# Patient Record
Sex: MALE | Race: BLACK OR AFRICAN AMERICAN | Employment: OTHER | URBAN - METROPOLITAN AREA
[De-identification: names, ages, dates, MRNs, and addresses within clinical notes are randomized per-mention and may not be internally consistent; named-entity substitution may affect disease eponyms.]

---

## 2017-02-14 ENCOUNTER — OFFICE VISIT (OUTPATIENT)
Dept: INTERNAL MEDICINE | Age: 67
End: 2017-02-14

## 2017-02-14 VITALS
HEART RATE: 69 BPM | BODY MASS INDEX: 23.1 KG/M2 | RESPIRATION RATE: 16 BRPM | OXYGEN SATURATION: 99 % | HEIGHT: 74 IN | SYSTOLIC BLOOD PRESSURE: 128 MMHG | TEMPERATURE: 97.5 F | WEIGHT: 180 LBS | DIASTOLIC BLOOD PRESSURE: 74 MMHG

## 2017-02-14 DIAGNOSIS — Z11.59 NEED FOR HEPATITIS C SCREENING TEST: ICD-10-CM

## 2017-02-14 DIAGNOSIS — N18.2 CKD (CHRONIC KIDNEY DISEASE) STAGE 2, GFR 60-89 ML/MIN: ICD-10-CM

## 2017-02-14 DIAGNOSIS — Z11.4 SCREENING FOR HIV (HUMAN IMMUNODEFICIENCY VIRUS): ICD-10-CM

## 2017-02-14 DIAGNOSIS — Z12.5 SCREENING FOR PROSTATE CANCER: ICD-10-CM

## 2017-02-14 DIAGNOSIS — I10 ESSENTIAL HYPERTENSION, BENIGN: Primary | ICD-10-CM

## 2017-02-14 DIAGNOSIS — D72.819 LEUKOPENIA, UNSPECIFIED TYPE: ICD-10-CM

## 2017-02-14 PROCEDURE — 99213 OFFICE O/P EST LOW 20 MIN: CPT | Performed by: INTERNAL MEDICINE

## 2017-02-14 RX ORDER — LISINOPRIL 20 MG/1
20 TABLET ORAL DAILY
Qty: 90 TABLET | Refills: 0 | Status: SHIPPED | OUTPATIENT
Start: 2017-02-14 | End: 2017-06-22 | Stop reason: SDUPTHER

## 2017-02-14 RX ORDER — TERAZOSIN 2 MG/1
2 CAPSULE ORAL DAILY
Qty: 90 CAPSULE | Refills: 0 | Status: SHIPPED | OUTPATIENT
Start: 2017-02-14 | End: 2017-06-22 | Stop reason: SDUPTHER

## 2017-02-14 RX ORDER — AMLODIPINE BESYLATE 10 MG/1
10 TABLET ORAL DAILY
Qty: 90 TABLET | Refills: 0 | Status: SHIPPED | OUTPATIENT
Start: 2017-02-14 | End: 2017-06-22 | Stop reason: SDUPTHER

## 2017-02-14 ASSESSMENT — ENCOUNTER SYMPTOMS
ABDOMINAL PAIN: 0
SHORTNESS OF BREATH: 0
COUGH: 0
WHEEZING: 0

## 2017-06-03 DIAGNOSIS — I10 ESSENTIAL HYPERTENSION, BENIGN: ICD-10-CM

## 2017-06-03 DIAGNOSIS — N18.2 CKD (CHRONIC KIDNEY DISEASE) STAGE 2, GFR 60-89 ML/MIN: ICD-10-CM

## 2017-06-03 DIAGNOSIS — Z12.5 SCREENING FOR PROSTATE CANCER: ICD-10-CM

## 2017-06-03 DIAGNOSIS — D72.819 LEUKOPENIA, UNSPECIFIED TYPE: ICD-10-CM

## 2017-06-03 LAB
ANION GAP SERPL CALCULATED.3IONS-SCNC: 9 MEQ/L (ref 7–13)
BUN BLDV-MCNC: 11 MG/DL (ref 8–23)
CALCIUM SERPL-MCNC: 8.5 MG/DL (ref 8.6–10.2)
CHLORIDE BLD-SCNC: 104 MEQ/L (ref 98–107)
CO2: 25 MEQ/L (ref 22–29)
CREAT SERPL-MCNC: 1.14 MG/DL (ref 0.7–1.2)
GFR AFRICAN AMERICAN: >60
GFR NON-AFRICAN AMERICAN: >60
GLUCOSE BLD-MCNC: 85 MG/DL (ref 74–109)
HCT VFR BLD CALC: 42.4 % (ref 42–52)
HEMOGLOBIN: 13.9 G/DL (ref 14–18)
MCH RBC QN AUTO: 27.8 PG (ref 27–31.3)
MCHC RBC AUTO-ENTMCNC: 32.8 % (ref 33–37)
MCV RBC AUTO: 84.7 FL (ref 80–100)
PDW BLD-RTO: 14.3 % (ref 11.5–14.5)
PLATELET # BLD: 250 K/UL (ref 130–400)
POTASSIUM SERPL-SCNC: 4.8 MEQ/L (ref 3.5–5.1)
PROSTATE SPECIFIC ANTIGEN: 1.36 NG/ML (ref 0–5.4)
RBC # BLD: 5 M/UL (ref 4.7–6.1)
SODIUM BLD-SCNC: 138 MEQ/L (ref 132–144)
WBC # BLD: 2.8 K/UL (ref 4.8–10.8)

## 2017-06-22 ENCOUNTER — OFFICE VISIT (OUTPATIENT)
Dept: INTERNAL MEDICINE | Age: 67
End: 2017-06-22

## 2017-06-22 VITALS
OXYGEN SATURATION: 98 % | WEIGHT: 179 LBS | SYSTOLIC BLOOD PRESSURE: 122 MMHG | HEART RATE: 83 BPM | TEMPERATURE: 98 F | RESPIRATION RATE: 16 BRPM | BODY MASS INDEX: 22.97 KG/M2 | HEIGHT: 74 IN | DIASTOLIC BLOOD PRESSURE: 70 MMHG

## 2017-06-22 DIAGNOSIS — I10 ESSENTIAL HYPERTENSION, BENIGN: Primary | ICD-10-CM

## 2017-06-22 DIAGNOSIS — D72.819 LEUKOPENIA, UNSPECIFIED TYPE: ICD-10-CM

## 2017-06-22 DIAGNOSIS — N18.2 CKD (CHRONIC KIDNEY DISEASE) STAGE 2, GFR 60-89 ML/MIN: ICD-10-CM

## 2017-06-22 PROCEDURE — 99214 OFFICE O/P EST MOD 30 MIN: CPT | Performed by: INTERNAL MEDICINE

## 2017-06-22 RX ORDER — TERAZOSIN 2 MG/1
2 CAPSULE ORAL DAILY
Qty: 90 CAPSULE | Refills: 0 | Status: SHIPPED | OUTPATIENT
Start: 2017-06-22 | End: 2017-09-21 | Stop reason: SDUPTHER

## 2017-06-22 RX ORDER — AMLODIPINE BESYLATE 10 MG/1
10 TABLET ORAL DAILY
Qty: 90 TABLET | Refills: 0 | Status: SHIPPED | OUTPATIENT
Start: 2017-06-22 | End: 2017-09-21 | Stop reason: SDUPTHER

## 2017-06-22 RX ORDER — LISINOPRIL 20 MG/1
20 TABLET ORAL DAILY
Qty: 90 TABLET | Refills: 0 | Status: SHIPPED | OUTPATIENT
Start: 2017-06-22 | End: 2017-09-21 | Stop reason: SDUPTHER

## 2017-06-22 ASSESSMENT — PATIENT HEALTH QUESTIONNAIRE - PHQ9
SUM OF ALL RESPONSES TO PHQ9 QUESTIONS 1 & 2: 0
SUM OF ALL RESPONSES TO PHQ QUESTIONS 1-9: 0
2. FEELING DOWN, DEPRESSED OR HOPELESS: 0
1. LITTLE INTEREST OR PLEASURE IN DOING THINGS: 0

## 2017-06-22 ASSESSMENT — ENCOUNTER SYMPTOMS
COUGH: 0
BLOOD IN STOOL: 0
SHORTNESS OF BREATH: 0
NAUSEA: 0
CONSTIPATION: 0
DIARRHEA: 0
VOMITING: 0
ABDOMINAL PAIN: 0

## 2017-07-05 DIAGNOSIS — Z12.11 SCREEN FOR COLON CANCER: Primary | ICD-10-CM

## 2017-07-10 LAB — HEPATITIS C ANTIBODY INTERPRETATION: NORMAL

## 2017-07-12 ENCOUNTER — ANESTHESIA EVENT (OUTPATIENT)
Dept: ENDOSCOPY | Age: 67
End: 2017-07-12
Payer: COMMERCIAL

## 2017-07-12 LAB — HIV-1 AND HIV-2 ANTIBODIES: NEGATIVE

## 2017-07-13 ENCOUNTER — HOSPITAL ENCOUNTER (OUTPATIENT)
Age: 67
Setting detail: OUTPATIENT SURGERY
Discharge: HOME | End: 2017-07-13
Attending: INTERNAL MEDICINE | Admitting: INTERNAL MEDICINE
Payer: COMMERCIAL

## 2017-07-13 ENCOUNTER — ANESTHESIA (OUTPATIENT)
Dept: ENDOSCOPY | Age: 67
End: 2017-07-13
Payer: COMMERCIAL

## 2017-07-13 ENCOUNTER — OFFICE VISIT (OUTPATIENT)
Dept: GASTROENTEROLOGY | Age: 67
End: 2017-07-13

## 2017-07-13 VITALS
SYSTOLIC BLOOD PRESSURE: 121 MMHG | TEMPERATURE: 97.6 F | BODY MASS INDEX: 23.1 KG/M2 | HEART RATE: 80 BPM | OXYGEN SATURATION: 100 % | DIASTOLIC BLOOD PRESSURE: 74 MMHG | WEIGHT: 180 LBS | HEIGHT: 74 IN | RESPIRATION RATE: 20 BRPM

## 2017-07-13 VITALS
OXYGEN SATURATION: 97 % | SYSTOLIC BLOOD PRESSURE: 113 MMHG | RESPIRATION RATE: 20 BRPM | DIASTOLIC BLOOD PRESSURE: 68 MMHG

## 2017-07-13 DIAGNOSIS — Z79.899 ENCOUNTER FOR LONG-TERM (CURRENT) USE OF MEDICATIONS: ICD-10-CM

## 2017-07-13 DIAGNOSIS — Z12.11 SPECIAL SCREENING FOR MALIGNANT NEOPLASMS, COLON: Primary | ICD-10-CM

## 2017-07-13 PROCEDURE — 3700000001 HC ADD 15 MINUTES (ANESTHESIA): Performed by: INTERNAL MEDICINE

## 2017-07-13 PROCEDURE — PREOPEXAM PRE-OP EXAM: Performed by: INTERNAL MEDICINE

## 2017-07-13 PROCEDURE — 7100000010 HC PHASE II RECOVERY - FIRST 15 MIN: Performed by: INTERNAL MEDICINE

## 2017-07-13 PROCEDURE — 6360000002 HC RX W HCPCS: Performed by: NURSE ANESTHETIST, CERTIFIED REGISTERED

## 2017-07-13 PROCEDURE — 3700000000 HC ANESTHESIA ATTENDED CARE: Performed by: INTERNAL MEDICINE

## 2017-07-13 PROCEDURE — 2500000003 HC RX 250 WO HCPCS: Performed by: NURSE ANESTHETIST, CERTIFIED REGISTERED

## 2017-07-13 PROCEDURE — 2580000003 HC RX 258: Performed by: INTERNAL MEDICINE

## 2017-07-13 PROCEDURE — 3609027000 HC COLONOSCOPY: Performed by: INTERNAL MEDICINE

## 2017-07-13 RX ORDER — SODIUM CHLORIDE 0.9 % (FLUSH) 0.9 %
10 SYRINGE (ML) INJECTION PRN
Status: CANCELLED | OUTPATIENT
Start: 2017-07-13

## 2017-07-13 RX ORDER — SODIUM CHLORIDE 0.9 % (FLUSH) 0.9 %
10 SYRINGE (ML) INJECTION EVERY 12 HOURS SCHEDULED
Status: CANCELLED | OUTPATIENT
Start: 2017-07-13

## 2017-07-13 RX ORDER — SODIUM CHLORIDE 9 MG/ML
INJECTION, SOLUTION INTRAVENOUS CONTINUOUS
Status: DISCONTINUED | OUTPATIENT
Start: 2017-07-13 | End: 2017-07-13 | Stop reason: HOSPADM

## 2017-07-13 RX ORDER — LIDOCAINE HYDROCHLORIDE 10 MG/ML
1 INJECTION, SOLUTION EPIDURAL; INFILTRATION; INTRACAUDAL; PERINEURAL
Status: CANCELLED | OUTPATIENT
Start: 2017-07-13 | End: 2017-07-13

## 2017-07-13 RX ORDER — LIDOCAINE HYDROCHLORIDE 20 MG/ML
INJECTION, SOLUTION INFILTRATION; PERINEURAL PRN
Status: DISCONTINUED | OUTPATIENT
Start: 2017-07-13 | End: 2017-07-13 | Stop reason: SDUPTHER

## 2017-07-13 RX ORDER — ONDANSETRON 2 MG/ML
4 INJECTION INTRAMUSCULAR; INTRAVENOUS
Status: DISCONTINUED | OUTPATIENT
Start: 2017-07-13 | End: 2017-07-13 | Stop reason: HOSPADM

## 2017-07-13 RX ORDER — PROPOFOL 10 MG/ML
INJECTION, EMULSION INTRAVENOUS PRN
Status: DISCONTINUED | OUTPATIENT
Start: 2017-07-13 | End: 2017-07-13 | Stop reason: SDUPTHER

## 2017-07-13 RX ADMIN — SODIUM CHLORIDE: 9 INJECTION, SOLUTION INTRAVENOUS at 13:20

## 2017-07-13 RX ADMIN — LIDOCAINE HYDROCHLORIDE 60 MG: 20 INJECTION, SOLUTION INFILTRATION; PERINEURAL at 14:31

## 2017-07-13 RX ADMIN — PROPOFOL 250 MG: 10 INJECTION, EMULSION INTRAVENOUS at 14:31

## 2017-07-13 ASSESSMENT — PAIN - FUNCTIONAL ASSESSMENT: PAIN_FUNCTIONAL_ASSESSMENT: 0-10

## 2017-08-01 VITALS
BODY MASS INDEX: 22.98 KG/M2 | DIASTOLIC BLOOD PRESSURE: 74 MMHG | WEIGHT: 179 LBS | HEART RATE: 81 BPM | SYSTOLIC BLOOD PRESSURE: 118 MMHG

## 2017-09-21 ENCOUNTER — OFFICE VISIT (OUTPATIENT)
Dept: FAMILY MEDICINE CLINIC | Age: 67
End: 2017-09-21

## 2017-09-21 VITALS
BODY MASS INDEX: 22.8 KG/M2 | WEIGHT: 172 LBS | TEMPERATURE: 96.3 F | OXYGEN SATURATION: 98 % | HEIGHT: 73 IN | RESPIRATION RATE: 15 BRPM | SYSTOLIC BLOOD PRESSURE: 136 MMHG | DIASTOLIC BLOOD PRESSURE: 82 MMHG | HEART RATE: 74 BPM

## 2017-09-21 DIAGNOSIS — R94.31 ABNORMAL EKG: ICD-10-CM

## 2017-09-21 DIAGNOSIS — I10 ESSENTIAL HYPERTENSION, BENIGN: Primary | ICD-10-CM

## 2017-09-21 PROCEDURE — 99213 OFFICE O/P EST LOW 20 MIN: CPT | Performed by: FAMILY MEDICINE

## 2017-09-21 PROCEDURE — 93000 ELECTROCARDIOGRAM COMPLETE: CPT | Performed by: FAMILY MEDICINE

## 2017-09-21 RX ORDER — TERAZOSIN 2 MG/1
2 CAPSULE ORAL DAILY
Qty: 90 CAPSULE | Refills: 1 | Status: SHIPPED | OUTPATIENT
Start: 2017-09-21 | End: 2018-02-25 | Stop reason: SDUPTHER

## 2017-09-21 RX ORDER — LISINOPRIL 20 MG/1
20 TABLET ORAL DAILY
Qty: 90 TABLET | Refills: 1 | Status: SHIPPED | OUTPATIENT
Start: 2017-09-21 | End: 2018-02-25 | Stop reason: SDUPTHER

## 2017-09-21 RX ORDER — AMLODIPINE BESYLATE 10 MG/1
10 TABLET ORAL DAILY
Qty: 90 TABLET | Refills: 1 | Status: SHIPPED | OUTPATIENT
Start: 2017-09-21 | End: 2018-02-25 | Stop reason: SDUPTHER

## 2017-09-21 ASSESSMENT — ENCOUNTER SYMPTOMS
SHORTNESS OF BREATH: 0
ABDOMINAL PAIN: 0

## 2017-09-29 ENCOUNTER — HOSPITAL ENCOUNTER (OUTPATIENT)
Dept: NON INVASIVE DIAGNOSTICS | Age: 67
Discharge: HOME | End: 2017-09-29
Payer: MEDICARE

## 2017-09-29 DIAGNOSIS — R94.31 ABNORMAL EKG: ICD-10-CM

## 2017-09-29 LAB
LV EF: 60 %
LVEF MODALITY: NORMAL

## 2017-09-29 PROCEDURE — 93306 TTE W/DOPPLER COMPLETE: CPT

## 2017-10-20 DIAGNOSIS — I10 ESSENTIAL HYPERTENSION, BENIGN: ICD-10-CM

## 2017-10-20 LAB
ALBUMIN SERPL-MCNC: 4 G/DL (ref 3.9–4.9)
ALP BLD-CCNC: 59 U/L (ref 35–104)
ALT SERPL-CCNC: 12 U/L (ref 0–41)
ANION GAP SERPL CALCULATED.3IONS-SCNC: 12 MEQ/L (ref 7–13)
ANISOCYTOSIS: 0
AST SERPL-CCNC: 15 U/L (ref 0–40)
ATYPICAL LYMPHOCYTE RELATIVE PERCENT: 10 %
BANDED NEUTROPHILS RELATIVE PERCENT: 1 %
BASOPHILS ABSOLUTE: 0 K/UL (ref 0–0.2)
BASOPHILS RELATIVE PERCENT: 1 %
BILIRUB SERPL-MCNC: 0.7 MG/DL (ref 0–1.2)
BUN BLDV-MCNC: 12 MG/DL (ref 8–23)
CALCIUM SERPL-MCNC: 9.1 MG/DL (ref 8.6–10.2)
CHLORIDE BLD-SCNC: 104 MEQ/L (ref 98–107)
CHOLESTEROL, TOTAL: 156 MG/DL (ref 0–199)
CO2: 25 MEQ/L (ref 22–29)
CREAT SERPL-MCNC: 0.85 MG/DL (ref 0.7–1.2)
EOSINOPHILS ABSOLUTE: 0.2 K/UL (ref 0–0.7)
EOSINOPHILS RELATIVE PERCENT: 5 %
GFR AFRICAN AMERICAN: >60
GFR NON-AFRICAN AMERICAN: >60
GLOBULIN: 2.8 G/DL (ref 2.3–3.5)
GLUCOSE BLD-MCNC: 84 MG/DL (ref 74–109)
HCT VFR BLD CALC: 44.9 % (ref 42–52)
HDLC SERPL-MCNC: 47 MG/DL (ref 40–59)
HEMOGLOBIN: 14.6 G/DL (ref 14–18)
HYPOCHROMIA: 0
LDL CHOLESTEROL CALCULATED: 95 MG/DL (ref 0–129)
LYMPHOCYTES ABSOLUTE: 1 K/UL (ref 1–4.8)
LYMPHOCYTES RELATIVE PERCENT: 17 %
MACROCYTES: 0
MCH RBC QN AUTO: 27.4 PG (ref 27–31.3)
MCHC RBC AUTO-ENTMCNC: 32.4 % (ref 33–37)
MCV RBC AUTO: 84.6 FL (ref 80–100)
MICROCYTES: ABNORMAL
MONOCYTES ABSOLUTE: 0.4 K/UL (ref 0.2–0.8)
MONOCYTES RELATIVE PERCENT: 9.6 %
NEUTROPHILS ABSOLUTE: 2.1 K/UL (ref 1.4–6.5)
NEUTROPHILS RELATIVE PERCENT: 56 %
PDW BLD-RTO: 13.9 % (ref 11.5–14.5)
PLATELET # BLD: 249 K/UL (ref 130–400)
PLATELET SLIDE REVIEW: ADEQUATE
POIKILOCYTES: ABNORMAL
POLYCHROMASIA: 0
POTASSIUM SERPL-SCNC: 4.2 MEQ/L (ref 3.5–5.1)
RBC # BLD: 5.31 M/UL (ref 4.7–6.1)
SMUDGE CELLS: 4.3
SODIUM BLD-SCNC: 141 MEQ/L (ref 132–144)
TOTAL PROTEIN: 6.8 G/DL (ref 6.4–8.1)
TRIGL SERPL-MCNC: 69 MG/DL (ref 0–200)
WBC # BLD: 3.7 K/UL (ref 4.8–10.8)

## 2017-10-26 ENCOUNTER — OFFICE VISIT (OUTPATIENT)
Dept: FAMILY MEDICINE CLINIC | Age: 67
End: 2017-10-26

## 2017-10-26 VITALS
WEIGHT: 179 LBS | TEMPERATURE: 96.7 F | RESPIRATION RATE: 14 BRPM | SYSTOLIC BLOOD PRESSURE: 134 MMHG | BODY MASS INDEX: 23.72 KG/M2 | OXYGEN SATURATION: 98 % | HEIGHT: 73 IN | DIASTOLIC BLOOD PRESSURE: 78 MMHG | HEART RATE: 79 BPM

## 2017-10-26 DIAGNOSIS — I10 ESSENTIAL HYPERTENSION, BENIGN: Primary | ICD-10-CM

## 2017-10-26 DIAGNOSIS — D72.819 LEUKOPENIA, UNSPECIFIED TYPE: ICD-10-CM

## 2017-10-26 DIAGNOSIS — I36.1 NON-RHEUMATIC TRICUSPID VALVE INSUFFICIENCY: ICD-10-CM

## 2017-10-26 PROCEDURE — G0009 ADMIN PNEUMOCOCCAL VACCINE: HCPCS | Performed by: FAMILY MEDICINE

## 2017-10-26 PROCEDURE — 99212 OFFICE O/P EST SF 10 MIN: CPT | Performed by: FAMILY MEDICINE

## 2017-10-26 PROCEDURE — 90732 PPSV23 VACC 2 YRS+ SUBQ/IM: CPT | Performed by: FAMILY MEDICINE

## 2017-10-26 NOTE — PROGRESS NOTES
Physical Exam  No exam done   Assessment:         1. Essential hypertension, benign     2. Non-rheumatic tricuspid valve insufficiency     3. Leukopenia, unspecified type        Plan:   Continue current medications   Repeat echo in a year   Orders Placed This Encounter   Procedures    Pneumococcal polysaccharide vaccine 23-valent greater than or equal to 3yo subcutaneous/IM     NDC 2257-9063-15 Use for 1 MDV PER PACKAGE  PNEUMOVAX 23 is approved for use in persons 48years of age or  older and persons aged . 2 years who are at increased risk for  pneumococcal disease     F/u in 6 months

## 2018-02-25 DIAGNOSIS — I10 ESSENTIAL HYPERTENSION, BENIGN: ICD-10-CM

## 2018-02-27 RX ORDER — TERAZOSIN 2 MG/1
2 CAPSULE ORAL DAILY
Qty: 90 CAPSULE | Refills: 0 | Status: SHIPPED | OUTPATIENT
Start: 2018-02-27 | End: 2018-04-23 | Stop reason: SDUPTHER

## 2018-02-27 RX ORDER — AMLODIPINE BESYLATE 10 MG/1
10 TABLET ORAL DAILY
Qty: 90 TABLET | Refills: 0 | Status: SHIPPED | OUTPATIENT
Start: 2018-02-27 | End: 2018-04-23 | Stop reason: SDUPTHER

## 2018-02-27 RX ORDER — LISINOPRIL 20 MG/1
20 TABLET ORAL DAILY
Qty: 90 TABLET | Refills: 0 | Status: SHIPPED | OUTPATIENT
Start: 2018-02-27 | End: 2018-04-23 | Stop reason: SDUPTHER

## 2018-04-23 ENCOUNTER — OFFICE VISIT (OUTPATIENT)
Dept: FAMILY MEDICINE CLINIC | Age: 68
End: 2018-04-23
Payer: MEDICARE

## 2018-04-23 VITALS
WEIGHT: 181.1 LBS | BODY MASS INDEX: 24 KG/M2 | TEMPERATURE: 97.8 F | OXYGEN SATURATION: 98 % | RESPIRATION RATE: 16 BRPM | DIASTOLIC BLOOD PRESSURE: 80 MMHG | SYSTOLIC BLOOD PRESSURE: 122 MMHG | HEIGHT: 73 IN | HEART RATE: 74 BPM

## 2018-04-23 DIAGNOSIS — D72.819 LEUKOPENIA, UNSPECIFIED TYPE: Primary | ICD-10-CM

## 2018-04-23 DIAGNOSIS — Z12.5 PROSTATE CANCER SCREENING: ICD-10-CM

## 2018-04-23 DIAGNOSIS — I10 ESSENTIAL HYPERTENSION, BENIGN: ICD-10-CM

## 2018-04-23 PROCEDURE — 99213 OFFICE O/P EST LOW 20 MIN: CPT | Performed by: FAMILY MEDICINE

## 2018-04-23 PROCEDURE — G8427 DOCREV CUR MEDS BY ELIG CLIN: HCPCS | Performed by: FAMILY MEDICINE

## 2018-04-23 PROCEDURE — 1123F ACP DISCUSS/DSCN MKR DOCD: CPT | Performed by: FAMILY MEDICINE

## 2018-04-23 PROCEDURE — 1036F TOBACCO NON-USER: CPT | Performed by: FAMILY MEDICINE

## 2018-04-23 PROCEDURE — 3017F COLORECTAL CA SCREEN DOC REV: CPT | Performed by: FAMILY MEDICINE

## 2018-04-23 PROCEDURE — 3288F FALL RISK ASSESSMENT DOCD: CPT | Performed by: FAMILY MEDICINE

## 2018-04-23 PROCEDURE — 4040F PNEUMOC VAC/ADMIN/RCVD: CPT | Performed by: FAMILY MEDICINE

## 2018-04-23 PROCEDURE — G8420 CALC BMI NORM PARAMETERS: HCPCS | Performed by: FAMILY MEDICINE

## 2018-04-23 RX ORDER — AMLODIPINE BESYLATE 10 MG/1
10 TABLET ORAL DAILY
Qty: 90 TABLET | Refills: 1 | Status: SHIPPED | OUTPATIENT
Start: 2018-04-23 | End: 2018-09-29 | Stop reason: SDUPTHER

## 2018-04-23 RX ORDER — LISINOPRIL 20 MG/1
20 TABLET ORAL DAILY
Qty: 90 TABLET | Refills: 1 | Status: SHIPPED | OUTPATIENT
Start: 2018-04-23 | End: 2018-09-29 | Stop reason: SDUPTHER

## 2018-04-23 RX ORDER — TERAZOSIN 2 MG/1
2 CAPSULE ORAL DAILY
Qty: 90 CAPSULE | Refills: 1 | Status: SHIPPED | OUTPATIENT
Start: 2018-04-23 | End: 2018-09-29 | Stop reason: SDUPTHER

## 2018-04-23 ASSESSMENT — ENCOUNTER SYMPTOMS
SHORTNESS OF BREATH: 0
ABDOMINAL PAIN: 0

## 2018-09-29 DIAGNOSIS — I10 ESSENTIAL HYPERTENSION, BENIGN: ICD-10-CM

## 2018-10-01 RX ORDER — LISINOPRIL 20 MG/1
20 TABLET ORAL DAILY
Qty: 90 TABLET | Refills: 0 | Status: SHIPPED | OUTPATIENT
Start: 2018-10-01 | End: 2018-10-30 | Stop reason: SDUPTHER

## 2018-10-01 RX ORDER — AMLODIPINE BESYLATE 10 MG/1
10 TABLET ORAL DAILY
Qty: 90 TABLET | Refills: 0 | Status: SHIPPED | OUTPATIENT
Start: 2018-10-01 | End: 2018-10-30 | Stop reason: SDUPTHER

## 2018-10-01 RX ORDER — TERAZOSIN 2 MG/1
2 CAPSULE ORAL DAILY
Qty: 90 CAPSULE | Refills: 0 | Status: SHIPPED | OUTPATIENT
Start: 2018-10-01 | End: 2018-10-30 | Stop reason: SDUPTHER

## 2018-10-23 DIAGNOSIS — D72.819 LEUKOPENIA, UNSPECIFIED TYPE: ICD-10-CM

## 2018-10-23 DIAGNOSIS — I10 ESSENTIAL HYPERTENSION, BENIGN: ICD-10-CM

## 2018-10-23 DIAGNOSIS — Z12.5 PROSTATE CANCER SCREENING: ICD-10-CM

## 2018-10-23 LAB
ALBUMIN SERPL-MCNC: 4.1 G/DL (ref 3.9–4.9)
ALP BLD-CCNC: 54 U/L (ref 35–104)
ALT SERPL-CCNC: 9 U/L (ref 0–41)
ANION GAP SERPL CALCULATED.3IONS-SCNC: 12 MEQ/L (ref 7–13)
AST SERPL-CCNC: 22 U/L (ref 0–40)
ATYPICAL LYMPHOCYTE RELATIVE PERCENT: 2 %
BASOPHILS ABSOLUTE: 0 K/UL (ref 0–0.2)
BASOPHILS RELATIVE PERCENT: 1 %
BILIRUB SERPL-MCNC: 0.9 MG/DL (ref 0–1.2)
BUN BLDV-MCNC: 9 MG/DL (ref 8–23)
CALCIUM SERPL-MCNC: 9.2 MG/DL (ref 8.6–10.2)
CHLORIDE BLD-SCNC: 104 MEQ/L (ref 98–107)
CHOLESTEROL, TOTAL: 142 MG/DL (ref 0–199)
CO2: 26 MEQ/L (ref 22–29)
CREAT SERPL-MCNC: 1.01 MG/DL (ref 0.7–1.2)
EOSINOPHILS ABSOLUTE: 0.1 K/UL (ref 0–0.7)
EOSINOPHILS RELATIVE PERCENT: 5 %
GFR AFRICAN AMERICAN: >60
GFR NON-AFRICAN AMERICAN: >60
GLOBULIN: 2.5 G/DL (ref 2.3–3.5)
GLUCOSE BLD-MCNC: 82 MG/DL (ref 74–109)
HCT VFR BLD CALC: 45.1 % (ref 42–52)
HDLC SERPL-MCNC: 49 MG/DL (ref 40–59)
HEMOGLOBIN: 14.8 G/DL (ref 14–18)
LDL CHOLESTEROL CALCULATED: 85 MG/DL (ref 0–129)
LYMPHOCYTES ABSOLUTE: 1 K/UL (ref 1–4.8)
LYMPHOCYTES RELATIVE PERCENT: 35 %
MCH RBC QN AUTO: 28.3 PG (ref 27–31.3)
MCHC RBC AUTO-ENTMCNC: 32.9 % (ref 33–37)
MCV RBC AUTO: 86 FL (ref 80–100)
MONOCYTES ABSOLUTE: 0.3 K/UL (ref 0.2–0.8)
MONOCYTES RELATIVE PERCENT: 9.4 %
NEUTROPHILS ABSOLUTE: 1.3 K/UL (ref 1.4–6.5)
NEUTROPHILS RELATIVE PERCENT: 48 %
PDW BLD-RTO: 13.4 % (ref 11.5–14.5)
PLATELET # BLD: 249 K/UL (ref 130–400)
POTASSIUM SERPL-SCNC: 4.6 MEQ/L (ref 3.5–5.1)
PROSTATE SPECIFIC ANTIGEN: 1.63 NG/ML (ref 0–5.4)
RBC # BLD: 5.25 M/UL (ref 4.7–6.1)
RBC # BLD: NORMAL 10*6/UL
SMUDGE CELLS: 3.8
SODIUM BLD-SCNC: 142 MEQ/L (ref 132–144)
TOTAL PROTEIN: 6.6 G/DL (ref 6.4–8.1)
TRIGL SERPL-MCNC: 40 MG/DL (ref 0–200)
WBC # BLD: 2.8 K/UL (ref 4.8–10.8)

## 2018-10-30 ENCOUNTER — OFFICE VISIT (OUTPATIENT)
Dept: FAMILY MEDICINE CLINIC | Age: 68
End: 2018-10-30
Payer: MEDICARE

## 2018-10-30 VITALS
RESPIRATION RATE: 14 BRPM | DIASTOLIC BLOOD PRESSURE: 82 MMHG | OXYGEN SATURATION: 100 % | WEIGHT: 185.6 LBS | SYSTOLIC BLOOD PRESSURE: 118 MMHG | HEART RATE: 69 BPM | BODY MASS INDEX: 24.6 KG/M2 | TEMPERATURE: 97.4 F | HEIGHT: 73 IN

## 2018-10-30 DIAGNOSIS — D72.819 LEUKOPENIA, UNSPECIFIED TYPE: ICD-10-CM

## 2018-10-30 DIAGNOSIS — I10 ESSENTIAL HYPERTENSION, BENIGN: Primary | ICD-10-CM

## 2018-10-30 DIAGNOSIS — Z12.5 PROSTATE CANCER SCREENING: ICD-10-CM

## 2018-10-30 LAB
BILIRUBIN, POC: NORMAL
BLOOD URINE, POC: NORMAL
CLARITY, POC: CLEAR
COLOR, POC: YELLOW
GLUCOSE URINE, POC: NORMAL
KETONES, POC: NORMAL
LEUKOCYTE EST, POC: NORMAL
NITRITE, POC: NORMAL
PH, POC: 6
PROTEIN, POC: NORMAL
SPECIFIC GRAVITY, POC: 1.01
UROBILINOGEN, POC: NORMAL

## 2018-10-30 PROCEDURE — 1101F PT FALLS ASSESS-DOCD LE1/YR: CPT | Performed by: FAMILY MEDICINE

## 2018-10-30 PROCEDURE — 1036F TOBACCO NON-USER: CPT | Performed by: FAMILY MEDICINE

## 2018-10-30 PROCEDURE — G8427 DOCREV CUR MEDS BY ELIG CLIN: HCPCS | Performed by: FAMILY MEDICINE

## 2018-10-30 PROCEDURE — 99214 OFFICE O/P EST MOD 30 MIN: CPT | Performed by: FAMILY MEDICINE

## 2018-10-30 PROCEDURE — 90670 PCV13 VACCINE IM: CPT | Performed by: FAMILY MEDICINE

## 2018-10-30 PROCEDURE — 3017F COLORECTAL CA SCREEN DOC REV: CPT | Performed by: FAMILY MEDICINE

## 2018-10-30 PROCEDURE — 4040F PNEUMOC VAC/ADMIN/RCVD: CPT | Performed by: FAMILY MEDICINE

## 2018-10-30 PROCEDURE — G8484 FLU IMMUNIZE NO ADMIN: HCPCS | Performed by: FAMILY MEDICINE

## 2018-10-30 PROCEDURE — G8420 CALC BMI NORM PARAMETERS: HCPCS | Performed by: FAMILY MEDICINE

## 2018-10-30 PROCEDURE — G0009 ADMIN PNEUMOCOCCAL VACCINE: HCPCS | Performed by: FAMILY MEDICINE

## 2018-10-30 PROCEDURE — 81003 URINALYSIS AUTO W/O SCOPE: CPT | Performed by: FAMILY MEDICINE

## 2018-10-30 PROCEDURE — 1123F ACP DISCUSS/DSCN MKR DOCD: CPT | Performed by: FAMILY MEDICINE

## 2018-10-30 RX ORDER — AMLODIPINE BESYLATE 10 MG/1
10 TABLET ORAL DAILY
Qty: 90 TABLET | Refills: 1 | Status: SHIPPED | OUTPATIENT
Start: 2018-10-30 | End: 2019-04-25 | Stop reason: SDUPTHER

## 2018-10-30 RX ORDER — TERAZOSIN 2 MG/1
2 CAPSULE ORAL DAILY
Qty: 90 CAPSULE | Refills: 1 | Status: SHIPPED | OUTPATIENT
Start: 2018-10-30 | End: 2019-04-25 | Stop reason: SDUPTHER

## 2018-10-30 RX ORDER — LISINOPRIL 20 MG/1
20 TABLET ORAL DAILY
Qty: 90 TABLET | Refills: 1 | Status: SHIPPED | OUTPATIENT
Start: 2018-10-30 | End: 2019-04-25 | Stop reason: SDUPTHER

## 2018-10-30 ASSESSMENT — PATIENT HEALTH QUESTIONNAIRE - PHQ9
2. FEELING DOWN, DEPRESSED OR HOPELESS: 0
SUM OF ALL RESPONSES TO PHQ QUESTIONS 1-9: 0
SUM OF ALL RESPONSES TO PHQ9 QUESTIONS 1 & 2: 0
SUM OF ALL RESPONSES TO PHQ QUESTIONS 1-9: 0
1. LITTLE INTEREST OR PLEASURE IN DOING THINGS: 0

## 2018-10-30 ASSESSMENT — ENCOUNTER SYMPTOMS
ABDOMINAL PAIN: 0
SHORTNESS OF BREATH: 0

## 2019-04-30 ENCOUNTER — OFFICE VISIT (OUTPATIENT)
Dept: FAMILY MEDICINE CLINIC | Age: 69
End: 2019-04-30
Payer: MEDICARE

## 2019-04-30 VITALS
RESPIRATION RATE: 12 BRPM | BODY MASS INDEX: 23.1 KG/M2 | OXYGEN SATURATION: 100 % | HEART RATE: 98 BPM | HEIGHT: 74 IN | WEIGHT: 180 LBS | TEMPERATURE: 96.1 F | SYSTOLIC BLOOD PRESSURE: 132 MMHG | DIASTOLIC BLOOD PRESSURE: 68 MMHG

## 2019-04-30 DIAGNOSIS — I10 ESSENTIAL HYPERTENSION, BENIGN: Primary | ICD-10-CM

## 2019-04-30 DIAGNOSIS — Z12.5 PROSTATE CANCER SCREENING: ICD-10-CM

## 2019-04-30 PROCEDURE — 99213 OFFICE O/P EST LOW 20 MIN: CPT | Performed by: FAMILY MEDICINE

## 2019-04-30 PROCEDURE — G8427 DOCREV CUR MEDS BY ELIG CLIN: HCPCS | Performed by: FAMILY MEDICINE

## 2019-04-30 PROCEDURE — 3288F FALL RISK ASSESSMENT DOCD: CPT | Performed by: FAMILY MEDICINE

## 2019-04-30 PROCEDURE — 4040F PNEUMOC VAC/ADMIN/RCVD: CPT | Performed by: FAMILY MEDICINE

## 2019-04-30 PROCEDURE — 1123F ACP DISCUSS/DSCN MKR DOCD: CPT | Performed by: FAMILY MEDICINE

## 2019-04-30 PROCEDURE — 1036F TOBACCO NON-USER: CPT | Performed by: FAMILY MEDICINE

## 2019-04-30 PROCEDURE — G8510 SCR DEP NEG, NO PLAN REQD: HCPCS | Performed by: FAMILY MEDICINE

## 2019-04-30 PROCEDURE — 3017F COLORECTAL CA SCREEN DOC REV: CPT | Performed by: FAMILY MEDICINE

## 2019-04-30 PROCEDURE — G8420 CALC BMI NORM PARAMETERS: HCPCS | Performed by: FAMILY MEDICINE

## 2019-04-30 RX ORDER — TERAZOSIN 2 MG/1
2 CAPSULE ORAL DAILY
Qty: 90 CAPSULE | Refills: 1 | Status: SHIPPED | OUTPATIENT
Start: 2019-04-30

## 2019-04-30 RX ORDER — AMLODIPINE BESYLATE 10 MG/1
10 TABLET ORAL DAILY
Qty: 90 TABLET | Refills: 1 | Status: SHIPPED | OUTPATIENT
Start: 2019-04-30

## 2019-04-30 RX ORDER — KETOROLAC TROMETHAMINE 5 MG/ML
SOLUTION OPHTHALMIC
COMMUNITY
Start: 2019-02-16

## 2019-04-30 RX ORDER — PREDNISOLONE ACETATE 10 MG/ML
SUSPENSION/ DROPS OPHTHALMIC
COMMUNITY
Start: 2019-03-15

## 2019-04-30 RX ORDER — LISINOPRIL 20 MG/1
20 TABLET ORAL DAILY
Qty: 90 TABLET | Refills: 1 | Status: SHIPPED | OUTPATIENT
Start: 2019-04-30

## 2019-04-30 ASSESSMENT — PATIENT HEALTH QUESTIONNAIRE - PHQ9
SUM OF ALL RESPONSES TO PHQ9 QUESTIONS 1 & 2: 0
SUM OF ALL RESPONSES TO PHQ QUESTIONS 1-9: 0
2. FEELING DOWN, DEPRESSED OR HOPELESS: 0
SUM OF ALL RESPONSES TO PHQ QUESTIONS 1-9: 0
1. LITTLE INTEREST OR PLEASURE IN DOING THINGS: 0

## 2019-04-30 ASSESSMENT — ENCOUNTER SYMPTOMS
ABDOMINAL PAIN: 0
SHORTNESS OF BREATH: 0

## 2019-04-30 NOTE — PROGRESS NOTES
Subjective:      Patient ID: Stephanie Peterson is a 79 y.o. male    HPI  Here in follow up for htn. No missed doses of medications. No bp readings since last time    Review of Systems   Constitutional: Negative for activity change, appetite change and unexpected weight change. Respiratory: Negative for shortness of breath. Cardiovascular: Negative for chest pain. Gastrointestinal: Negative for abdominal pain. Neurological: Negative for dizziness and weakness. Reviewed allergy, medical, social, surgical, family and med list changes and updated   Files     Social History     Socioeconomic History    Marital status:      Spouse name: None    Number of children: None    Years of education: None    Highest education level: None   Occupational History    None   Social Needs    Financial resource strain: None    Food insecurity:     Worry: None     Inability: None    Transportation needs:     Medical: None     Non-medical: None   Tobacco Use    Smoking status: Never Smoker    Smokeless tobacco: Never Used   Substance and Sexual Activity    Alcohol use:  Yes     Alcohol/week: 1.0 oz     Types: 2 Standard drinks or equivalent per week    Drug use: No    Sexual activity: Yes     Partners: Female   Lifestyle    Physical activity:     Days per week: None     Minutes per session: None    Stress: None   Relationships    Social connections:     Talks on phone: None     Gets together: None     Attends Evangelical service: None     Active member of club or organization: None     Attends meetings of clubs or organizations: None     Relationship status: None    Intimate partner violence:     Fear of current or ex partner: None     Emotionally abused: None     Physically abused: None     Forced sexual activity: None   Other Topics Concern    None   Social History Narrative    None     Current Outpatient Medications   Medication Sig Dispense Refill    ketorolac (ACULAR) 0.5 % ophthalmic solution       prednisoLONE acetate (PRED FORTE) 1 % ophthalmic suspension       amLODIPine (NORVASC) 10 MG tablet TAKE 1 TABLET BY MOUTH  DAILY 90 tablet 0    terazosin (HYTRIN) 2 MG capsule TAKE 1 CAPSULE BY MOUTH  DAILY 90 capsule 0    lisinopril (PRINIVIL;ZESTRIL) 20 MG tablet TAKE 1 TABLET BY MOUTH  DAILY 90 tablet 0     No current facility-administered medications for this visit. Family History   Problem Relation Age of Onset    Diabetes Mother     High Blood Pressure Mother     High Blood Pressure Father     Heart Disease Father     Diabetes Brother      Past Medical History:   Diagnosis Date    CKD (chronic kidney disease) stage 2, GFR 60-89 ml/min 10/10/2011    Hypertension     Osteoarthritis of thumb 9/13/2011     Objective:   /68   Pulse 98   Temp 96.1 °F (35.6 °C) (Tympanic)   Resp 12   Ht 6' 2\" (1.88 m)   Wt 180 lb (81.6 kg)   SpO2 100%   BMI 23.11 kg/m²     Physical Exam  Neck:no carotid bruits. No masses. No adenopathy. No thyroid asymmetry. Lungs:clear and equal breath sounds. No wheezes or rales. Heart:rate reg. No murmur. No gallops   Pulses:Radials 2+ equal               Poster tib 1+ equal  Extremities:no edema in either leg  Gen: In no acute distress  Abdomen; B.S present. Soft  Non tender. No hepatosplenomegaly. No masses   Assessment:          Diagnosis Orders   1. Essential hypertension, benign  amLODIPine (NORVASC) 10 MG tablet    terazosin (HYTRIN) 2 MG capsule    lisinopril (PRINIVIL;ZESTRIL) 20 MG tablet   2.  Prostate cancer screening        Plan:      Orders Placed This Encounter   Medications    amLODIPine (NORVASC) 10 MG tablet     Sig: Take 1 tablet by mouth daily     Dispense:  90 tablet     Refill:  1    terazosin (HYTRIN) 2 MG capsule     Sig: Take 1 capsule by mouth daily     Dispense:  90 capsule     Refill:  1    lisinopril (PRINIVIL;ZESTRIL) 20 MG tablet     Sig: Take 1 tablet by mouth daily     Dispense:  90 tablet     Refill:  1   fasting blood

## 2023-04-06 DIAGNOSIS — I10 ESSENTIAL HYPERTENSION: ICD-10-CM

## 2023-04-06 DIAGNOSIS — Z12.5 SCREENING PSA (PROSTATE SPECIFIC ANTIGEN): ICD-10-CM

## 2023-04-06 DIAGNOSIS — I87.2 VENOUS INSUFFICIENCY (CHRONIC) (PERIPHERAL): ICD-10-CM

## 2023-04-06 PROBLEM — G47.33 OBSTRUCTIVE SLEEP APNEA, ADULT: Status: ACTIVE | Noted: 2023-04-06

## 2023-04-10 RX ORDER — TERAZOSIN 2 MG/1
2 CAPSULE ORAL NIGHTLY
COMMUNITY
Start: 2019-08-20 | End: 2023-08-01

## 2023-04-10 RX ORDER — AMLODIPINE BESYLATE 10 MG/1
1 TABLET ORAL DAILY
COMMUNITY
Start: 2022-12-05 | End: 2023-08-01

## 2023-04-10 RX ORDER — VALSARTAN 160 MG/1
160 TABLET ORAL DAILY
Qty: 90 TABLET | Refills: 3 | Status: SHIPPED | OUTPATIENT
Start: 2023-04-10 | End: 2024-04-30

## 2023-04-10 RX ORDER — CHOLECALCIFEROL (VITAMIN D3) 50 MCG
TABLET ORAL
COMMUNITY

## 2023-04-11 LAB
ALANINE AMINOTRANSFERASE (SGPT) (U/L) IN SER/PLAS: 11 U/L (ref 10–52)
ALBUMIN (G/DL) IN SER/PLAS: 3.9 G/DL (ref 3.4–5)
ALKALINE PHOSPHATASE (U/L) IN SER/PLAS: 51 U/L (ref 33–136)
ANION GAP IN SER/PLAS: 10 MMOL/L (ref 10–20)
ASPARTATE AMINOTRANSFERASE (SGOT) (U/L) IN SER/PLAS: 14 U/L (ref 9–39)
BASOPHILS (10*3/UL) IN BLOOD BY AUTOMATED COUNT: 0.02 X10E9/L (ref 0–0.1)
BASOPHILS/100 LEUKOCYTES IN BLOOD BY AUTOMATED COUNT: 0.6 % (ref 0–2)
BILIRUBIN TOTAL (MG/DL) IN SER/PLAS: 0.7 MG/DL (ref 0–1.2)
CALCIDIOL (25 OH VITAMIN D3) (NG/ML) IN SER/PLAS: 37 NG/ML
CALCIUM (MG/DL) IN SER/PLAS: 9.1 MG/DL (ref 8.6–10.3)
CARBON DIOXIDE, TOTAL (MMOL/L) IN SER/PLAS: 32 MMOL/L (ref 21–32)
CHLORIDE (MMOL/L) IN SER/PLAS: 105 MMOL/L (ref 98–107)
CHOLESTEROL (MG/DL) IN SER/PLAS: 144 MG/DL (ref 0–199)
CHOLESTEROL IN HDL (MG/DL) IN SER/PLAS: 36.7 MG/DL
CHOLESTEROL/HDL RATIO: 3.9
CREATININE (MG/DL) IN SER/PLAS: 1.3 MG/DL (ref 0.5–1.3)
EOSINOPHILS (10*3/UL) IN BLOOD BY AUTOMATED COUNT: 0.11 X10E9/L (ref 0–0.7)
EOSINOPHILS/100 LEUKOCYTES IN BLOOD BY AUTOMATED COUNT: 3.5 % (ref 0–6)
ERYTHROCYTE DISTRIBUTION WIDTH (RATIO) BY AUTOMATED COUNT: 13.3 % (ref 11.5–14.5)
ERYTHROCYTE MEAN CORPUSCULAR HEMOGLOBIN CONCENTRATION (G/DL) BY AUTOMATED: 31.9 G/DL (ref 32–36)
ERYTHROCYTE MEAN CORPUSCULAR VOLUME (FL) BY AUTOMATED COUNT: 86 FL (ref 80–100)
ERYTHROCYTES (10*6/UL) IN BLOOD BY AUTOMATED COUNT: 5.37 X10E12/L (ref 4.5–5.9)
GFR MALE: 59 ML/MIN/1.73M2
GLUCOSE (MG/DL) IN SER/PLAS: 100 MG/DL (ref 74–99)
HEMATOCRIT (%) IN BLOOD BY AUTOMATED COUNT: 46.1 % (ref 41–52)
HEMOGLOBIN (G/DL) IN BLOOD: 14.7 G/DL (ref 13.5–17.5)
IMMATURE GRANULOCYTES/100 LEUKOCYTES IN BLOOD BY AUTOMATED COUNT: 0.3 % (ref 0–0.9)
LDL: 95 MG/DL (ref 0–99)
LEUKOCYTES (10*3/UL) IN BLOOD BY AUTOMATED COUNT: 3.1 X10E9/L (ref 4.4–11.3)
LYMPHOCYTES (10*3/UL) IN BLOOD BY AUTOMATED COUNT: 0.98 X10E9/L (ref 1.2–4.8)
LYMPHOCYTES/100 LEUKOCYTES IN BLOOD BY AUTOMATED COUNT: 31.5 % (ref 13–44)
MAGNESIUM (MG/DL) IN SER/PLAS: 2.1 MG/DL (ref 1.6–2.4)
MONOCYTES (10*3/UL) IN BLOOD BY AUTOMATED COUNT: 0.33 X10E9/L (ref 0.1–1)
MONOCYTES/100 LEUKOCYTES IN BLOOD BY AUTOMATED COUNT: 10.6 % (ref 2–10)
NEUTROPHILS (10*3/UL) IN BLOOD BY AUTOMATED COUNT: 1.66 X10E9/L (ref 1.2–7.7)
NEUTROPHILS/100 LEUKOCYTES IN BLOOD BY AUTOMATED COUNT: 53.5 % (ref 40–80)
PLATELETS (10*3/UL) IN BLOOD AUTOMATED COUNT: 334 X10E9/L (ref 150–450)
POTASSIUM (MMOL/L) IN SER/PLAS: 4.6 MMOL/L (ref 3.5–5.3)
PROTEIN TOTAL: 6.7 G/DL (ref 6.4–8.2)
SODIUM (MMOL/L) IN SER/PLAS: 142 MMOL/L (ref 136–145)
THYROTROPIN (MIU/L) IN SER/PLAS BY DETECTION LIMIT <= 0.05 MIU/L: 0.91 MIU/L (ref 0.44–3.98)
TRIGLYCERIDE (MG/DL) IN SER/PLAS: 60 MG/DL (ref 0–149)
UREA NITROGEN (MG/DL) IN SER/PLAS: 13 MG/DL (ref 6–23)
VLDL: 12 MG/DL (ref 0–40)

## 2023-05-08 NOTE — PROGRESS NOTES
Subjective   Reason for Visit: Jatinder Estrada is an 72 y.o. male here for a Medicare Wellness visit.     Past Medical, Surgical, and Family History reviewed and updated in chart.    Reviewed all medications by prescribing practitioner or clinical pharmacist (such as prescriptions, OTCs, herbal therapies and supplements) and documented in the medical record.    Hypertension  This is a chronic problem. The current episode started more than 1 year ago. The problem is unchanged. Pertinent negatives include no chest pain, headaches, neck pain, palpitations or shortness of breath.       Patient Care Team:  Buddy Salvador DO as PCP - General  Buddy Salvador DO as PCP - [employer] PCP     Review of Systems   Constitutional:  Negative for activity change, appetite change, chills, diaphoresis, fatigue, fever and unexpected weight change.   HENT:  Negative for congestion, ear pain, hearing loss, nosebleeds, postnasal drip, rhinorrhea, sinus pressure, sneezing, sore throat, tinnitus, trouble swallowing and voice change.    Eyes:  Negative for photophobia, pain, discharge, redness, itching and visual disturbance.   Respiratory:  Positive for cough. Negative for choking, chest tightness, shortness of breath and wheezing.         Still taking lisinopril, has not yet started valsartan   Cardiovascular:  Negative for chest pain, palpitations and leg swelling.   Gastrointestinal:  Negative for abdominal distention, abdominal pain, blood in stool, constipation, diarrhea, nausea and vomiting.   Endocrine: Negative for cold intolerance, heat intolerance, polydipsia and polyuria.   Genitourinary:  Negative for dysuria, flank pain, frequency, hematuria and urgency.   Musculoskeletal:  Negative for arthralgias, back pain, joint swelling, myalgias, neck pain and neck stiffness.   Skin:  Negative for rash and wound.   Allergic/Immunologic: Negative for immunocompromised state.   Neurological:  Negative for dizziness,  "tremors, seizures, syncope, facial asymmetry, speech difficulty, weakness, light-headedness, numbness and headaches.   Hematological:  Negative for adenopathy. Does not bruise/bleed easily.   Psychiatric/Behavioral:  Negative for agitation, behavioral problems, confusion, dysphoric mood, hallucinations, self-injury, sleep disturbance and suicidal ideas. The patient is not nervous/anxious.        Objective   Vitals:  /77 (BP Location: Right arm, Patient Position: Sitting, BP Cuff Size: Large adult)   Pulse 80   Temp 36.4 °C (97.5 °F) (Temporal)   Resp 16   Ht 1.867 m (6' 1.5\")   Wt 81.6 kg (180 lb)   SpO2 98%   BMI 23.43 kg/m²       Physical Exam  Constitutional:       General: He is not in acute distress.     Appearance: He is not ill-appearing or diaphoretic.   HENT:      Head: Normocephalic and atraumatic.      Right Ear: External ear normal.      Left Ear: External ear normal.      Nose: Nose normal. No rhinorrhea.   Eyes:      General: Lids are normal. No scleral icterus.        Right eye: No discharge.         Left eye: No discharge.      Conjunctiva/sclera: Conjunctivae normal.   Cardiovascular:      Rate and Rhythm: Normal rate and regular rhythm.      Pulses: Normal pulses.      Heart sounds: Murmur heard.      Systolic murmur is present with a grade of 2/6.   Pulmonary:      Effort: Pulmonary effort is normal. No respiratory distress.      Breath sounds: No decreased breath sounds, wheezing, rhonchi or rales.   Abdominal:      General: Bowel sounds are normal. There is no distension.      Palpations: Abdomen is soft. There is no mass.      Tenderness: There is no abdominal tenderness. There is no guarding or rebound.   Musculoskeletal:         General: No swelling, tenderness or deformity.      Cervical back: No rigidity or tenderness.      Right lower leg: No edema.      Left lower leg: No edema.   Lymphadenopathy:      Cervical: No cervical adenopathy.      Upper Body:      Right upper body: " No supraclavicular adenopathy.      Left upper body: No supraclavicular adenopathy.   Skin:     General: Skin is warm and dry.      Coloration: Skin is not jaundiced or pale.      Findings: No erythema, lesion or rash.   Neurological:      General: No focal deficit present.      Mental Status: He is alert and oriented to person, place, and time.      Sensory: No sensory deficit.      Motor: No weakness or tremor.      Coordination: Coordination normal.      Gait: Gait normal.   Psychiatric:         Mood and Affect: Mood normal. Affect is not inappropriate.         Behavior: Behavior normal.         Assessment/Plan   Problem List Items Addressed This Visit          Nervous    Sleep apnea treated with continuous positive airway pressure (CPAP)    Relevant Orders    Follow Up In [facility] - PCP       Circulatory    Essential hypertension    Relevant Orders    CBC and Auto Differential    Comprehensive Metabolic Panel    Lipid Panel    Magnesium    TSH with reflex to Free T4 if abnormal    Follow Up In [facility] - PCP       Other    Benign prostatic hyperplasia with urinary frequency    Relevant Medications    dutasteride (Avodart) 0.5 mg capsule    Other Relevant Orders    Follow Up In [facility] - PCP     Other Visit Diagnoses       Routine general medical examination at health care facility    -  Primary    Relevant Orders    Follow Up In [facility] - PCP    Encounter for immunization        Relevant Medications    diphth,pertus,acell,,tetanus (BoostRIX) 2.5-8-5 Lf-mcg-Lf/0.5mL injection    Other Relevant Orders    Follow Up In [facility] - PCP    Screening for prostate cancer        Relevant Orders    Prostate Specific Antigen, Screen    Follow Up In [facility] - PCP          As far as cough discussed-likely ACE inhibitor.  Recommend start valsartan at equivalent dosage i.e. 160 mg and anticipate gradual reduction of cough over the course of about 2  weeks.  If cough persists then will evaluate for allergies, asthma, reflux and discontinue ARB and switch to alternate therapy (potentially increasing alpha-1 blocker)

## 2023-05-16 ENCOUNTER — OFFICE VISIT (OUTPATIENT)
Dept: PRIMARY CARE | Facility: CLINIC | Age: 73
End: 2023-05-16
Payer: MEDICARE

## 2023-05-16 VITALS
WEIGHT: 180 LBS | TEMPERATURE: 97.5 F | HEART RATE: 80 BPM | RESPIRATION RATE: 16 BRPM | SYSTOLIC BLOOD PRESSURE: 135 MMHG | OXYGEN SATURATION: 98 % | BODY MASS INDEX: 23.1 KG/M2 | DIASTOLIC BLOOD PRESSURE: 77 MMHG | HEIGHT: 74 IN

## 2023-05-16 DIAGNOSIS — G47.30 SLEEP APNEA TREATED WITH CONTINUOUS POSITIVE AIRWAY PRESSURE (CPAP): ICD-10-CM

## 2023-05-16 DIAGNOSIS — R35.0 BENIGN PROSTATIC HYPERPLASIA WITH URINARY FREQUENCY: ICD-10-CM

## 2023-05-16 DIAGNOSIS — Z23 ENCOUNTER FOR IMMUNIZATION: ICD-10-CM

## 2023-05-16 DIAGNOSIS — Z12.5 SCREENING FOR PROSTATE CANCER: ICD-10-CM

## 2023-05-16 DIAGNOSIS — N40.1 BENIGN PROSTATIC HYPERPLASIA WITH URINARY FREQUENCY: ICD-10-CM

## 2023-05-16 DIAGNOSIS — I10 ESSENTIAL HYPERTENSION: ICD-10-CM

## 2023-05-16 DIAGNOSIS — Z00.00 ROUTINE GENERAL MEDICAL EXAMINATION AT HEALTH CARE FACILITY: Primary | ICD-10-CM

## 2023-05-16 PROCEDURE — 1160F RVW MEDS BY RX/DR IN RCRD: CPT | Performed by: FAMILY MEDICINE

## 2023-05-16 PROCEDURE — 99214 OFFICE O/P EST MOD 30 MIN: CPT | Performed by: FAMILY MEDICINE

## 2023-05-16 PROCEDURE — 1159F MED LIST DOCD IN RCRD: CPT | Performed by: FAMILY MEDICINE

## 2023-05-16 PROCEDURE — 3078F DIAST BP <80 MM HG: CPT | Performed by: FAMILY MEDICINE

## 2023-05-16 PROCEDURE — G0439 PPPS, SUBSEQ VISIT: HCPCS | Performed by: FAMILY MEDICINE

## 2023-05-16 PROCEDURE — 3075F SYST BP GE 130 - 139MM HG: CPT | Performed by: FAMILY MEDICINE

## 2023-05-16 PROCEDURE — 1036F TOBACCO NON-USER: CPT | Performed by: FAMILY MEDICINE

## 2023-05-16 PROCEDURE — 1170F FXNL STATUS ASSESSED: CPT | Performed by: FAMILY MEDICINE

## 2023-05-16 RX ORDER — DUTASTERIDE 0.5 MG/1
0.5 CAPSULE, LIQUID FILLED ORAL DAILY
Qty: 90 CAPSULE | Refills: 3 | Status: SHIPPED | OUTPATIENT
Start: 2023-05-16 | End: 2024-05-16

## 2023-05-16 ASSESSMENT — ENCOUNTER SYMPTOMS
PHOTOPHOBIA: 0
ABDOMINAL PAIN: 0
BRUISES/BLEEDS EASILY: 0
SEIZURES: 0
CHOKING: 0
HYPERTENSION: 1
RHINORRHEA: 0
LIGHT-HEADEDNESS: 0
DIARRHEA: 0
DEPRESSION: 0
ADENOPATHY: 0
EYE ITCHING: 0
WOUND: 0
SPEECH DIFFICULTY: 0
EYE PAIN: 0
DYSPHORIC MOOD: 0
SHORTNESS OF BREATH: 0
ACTIVITY CHANGE: 0
FEVER: 0
NECK STIFFNESS: 0
LOSS OF SENSATION IN FEET: 0
HALLUCINATIONS: 0
VOMITING: 0
BACK PAIN: 0
FATIGUE: 0
CONSTIPATION: 0
MYALGIAS: 0
NUMBNESS: 0
EYE REDNESS: 0
TREMORS: 0
UNEXPECTED WEIGHT CHANGE: 0
TROUBLE SWALLOWING: 0
POLYDIPSIA: 0
AGITATION: 0
SLEEP DISTURBANCE: 0
JOINT SWELLING: 0
CHILLS: 0
NAUSEA: 0
CHEST TIGHTNESS: 0
SINUS PRESSURE: 0
FLANK PAIN: 0
FACIAL ASYMMETRY: 0
PALPITATIONS: 0
CONFUSION: 0
DYSURIA: 0
FREQUENCY: 0
DIAPHORESIS: 0
WEAKNESS: 0
ARTHRALGIAS: 0
ABDOMINAL DISTENTION: 0
APPETITE CHANGE: 0
NERVOUS/ANXIOUS: 0
DIZZINESS: 0
VOICE CHANGE: 0
OCCASIONAL FEELINGS OF UNSTEADINESS: 0
NECK PAIN: 0
SORE THROAT: 0
BLOOD IN STOOL: 0
COUGH: 1
EYE DISCHARGE: 0
WHEEZING: 0
HEMATURIA: 0
HEADACHES: 0

## 2023-05-16 ASSESSMENT — PATIENT HEALTH QUESTIONNAIRE - PHQ9
2. FEELING DOWN, DEPRESSED OR HOPELESS: NOT AT ALL
SUM OF ALL RESPONSES TO PHQ9 QUESTIONS 1 AND 2: 0
1. LITTLE INTEREST OR PLEASURE IN DOING THINGS: NOT AT ALL

## 2023-05-16 ASSESSMENT — ACTIVITIES OF DAILY LIVING (ADL)
DOING_HOUSEWORK: INDEPENDENT
BATHING: INDEPENDENT
DRESSING: INDEPENDENT
MANAGING_FINANCES: INDEPENDENT
TAKING_MEDICATION: INDEPENDENT
GROCERY_SHOPPING: INDEPENDENT

## 2023-08-01 DIAGNOSIS — N40.1 BENIGN PROSTATIC HYPERPLASIA WITH URINARY FREQUENCY: ICD-10-CM

## 2023-08-01 DIAGNOSIS — I10 ESSENTIAL HYPERTENSION: ICD-10-CM

## 2023-08-01 DIAGNOSIS — R35.0 BENIGN PROSTATIC HYPERPLASIA WITH URINARY FREQUENCY: ICD-10-CM

## 2023-08-01 RX ORDER — AMLODIPINE BESYLATE 10 MG/1
10 TABLET ORAL DAILY
Qty: 100 TABLET | Refills: 3 | Status: SHIPPED | OUTPATIENT
Start: 2023-08-01 | End: 2024-05-16 | Stop reason: SDUPTHER

## 2023-08-01 RX ORDER — TERAZOSIN 2 MG/1
2 CAPSULE ORAL NIGHTLY
Qty: 100 CAPSULE | Refills: 3 | Status: SHIPPED | OUTPATIENT
Start: 2023-08-01

## 2024-04-15 ENCOUNTER — TELEPHONE (OUTPATIENT)
Dept: PRIMARY CARE | Facility: CLINIC | Age: 74
End: 2024-04-15
Payer: MEDICARE

## 2024-04-15 NOTE — TELEPHONE ENCOUNTER
Dr Salvador pt calling in requesting an order for Depends, dx: enlarged prostate. Please send to [id number]. Pt can be reached at [phone]

## 2024-04-16 DIAGNOSIS — N39.490 OVERFLOW INCONTINENCE: ICD-10-CM

## 2024-04-16 RX ORDER — INCONTINENCE PAD,LINER,DISP
EACH MISCELLANEOUS
Qty: 96 EACH | Refills: 0 | Status: SHIPPED | OUTPATIENT
Start: 2024-04-16

## 2024-04-29 DIAGNOSIS — I10 ESSENTIAL HYPERTENSION: ICD-10-CM

## 2024-04-30 RX ORDER — VALSARTAN 160 MG/1
160 TABLET ORAL DAILY
Qty: 90 TABLET | Refills: 0 | Status: SHIPPED | OUTPATIENT
Start: 2024-04-30 | End: 2024-05-16 | Stop reason: SDUPTHER

## 2024-05-03 ENCOUNTER — LAB (OUTPATIENT)
Dept: LAB | Facility: LAB | Age: 74
End: 2024-05-03
Payer: MEDICARE

## 2024-05-03 DIAGNOSIS — I10 ESSENTIAL HYPERTENSION: ICD-10-CM

## 2024-05-03 DIAGNOSIS — Z12.5 SCREENING FOR PROSTATE CANCER: ICD-10-CM

## 2024-05-03 LAB
ALBUMIN SERPL BCP-MCNC: 4.2 G/DL (ref 3.4–5)
ALP SERPL-CCNC: 55 U/L (ref 33–136)
ALT SERPL W P-5'-P-CCNC: 12 U/L (ref 10–52)
ANION GAP SERPL CALC-SCNC: 9 MMOL/L (ref 10–20)
AST SERPL W P-5'-P-CCNC: 14 U/L (ref 9–39)
BASOPHILS # BLD AUTO: 0.01 X10*3/UL (ref 0–0.1)
BASOPHILS NFR BLD AUTO: 0.3 %
BILIRUB SERPL-MCNC: 0.9 MG/DL (ref 0–1.2)
BUN SERPL-MCNC: 11 MG/DL (ref 6–23)
CALCIUM SERPL-MCNC: 9.2 MG/DL (ref 8.6–10.3)
CHLORIDE SERPL-SCNC: 107 MMOL/L (ref 98–107)
CHOLEST SERPL-MCNC: 165 MG/DL (ref 0–199)
CHOLESTEROL/HDL RATIO: 3.6
CO2 SERPL-SCNC: 30 MMOL/L (ref 21–32)
CREAT SERPL-MCNC: 1.28 MG/DL (ref 0.5–1.3)
EGFRCR SERPLBLD CKD-EPI 2021: 60 ML/MIN/1.73M*2
EOSINOPHIL # BLD AUTO: 0.12 X10*3/UL (ref 0–0.4)
EOSINOPHIL NFR BLD AUTO: 3.4 %
ERYTHROCYTE [DISTWIDTH] IN BLOOD BY AUTOMATED COUNT: 13.4 % (ref 11.5–14.5)
GLUCOSE SERPL-MCNC: 99 MG/DL (ref 74–99)
HCT VFR BLD AUTO: 46.3 % (ref 41–52)
HDLC SERPL-MCNC: 45.7 MG/DL
HGB BLD-MCNC: 15 G/DL (ref 13.5–17.5)
IMM GRANULOCYTES # BLD AUTO: 0.01 X10*3/UL (ref 0–0.5)
IMM GRANULOCYTES NFR BLD AUTO: 0.3 % (ref 0–0.9)
LDLC SERPL CALC-MCNC: 104 MG/DL
LYMPHOCYTES # BLD AUTO: 1.07 X10*3/UL (ref 0.8–3)
LYMPHOCYTES NFR BLD AUTO: 30 %
MAGNESIUM SERPL-MCNC: 1.97 MG/DL (ref 1.6–2.4)
MCH RBC QN AUTO: 28 PG (ref 26–34)
MCHC RBC AUTO-ENTMCNC: 32.4 G/DL (ref 32–36)
MCV RBC AUTO: 86 FL (ref 80–100)
MONOCYTES # BLD AUTO: 0.39 X10*3/UL (ref 0.05–0.8)
MONOCYTES NFR BLD AUTO: 10.9 %
NEUTROPHILS # BLD AUTO: 1.97 X10*3/UL (ref 1.6–5.5)
NEUTROPHILS NFR BLD AUTO: 55.1 %
NON HDL CHOLESTEROL: 119 MG/DL (ref 0–149)
NRBC BLD-RTO: 0 /100 WBCS (ref 0–0)
PLATELET # BLD AUTO: 271 X10*3/UL (ref 150–450)
POTASSIUM SERPL-SCNC: 4.6 MMOL/L (ref 3.5–5.3)
PROT SERPL-MCNC: 7 G/DL (ref 6.4–8.2)
PSA SERPL-MCNC: 7.67 NG/ML
RBC # BLD AUTO: 5.36 X10*6/UL (ref 4.5–5.9)
SODIUM SERPL-SCNC: 141 MMOL/L (ref 136–145)
TRIGL SERPL-MCNC: 79 MG/DL (ref 0–149)
TSH SERPL-ACNC: 1.03 MIU/L (ref 0.44–3.98)
VLDL: 16 MG/DL (ref 0–40)
WBC # BLD AUTO: 3.6 X10*3/UL (ref 4.4–11.3)

## 2024-05-03 PROCEDURE — 80053 COMPREHEN METABOLIC PANEL: CPT

## 2024-05-03 PROCEDURE — 36415 COLL VENOUS BLD VENIPUNCTURE: CPT

## 2024-05-03 PROCEDURE — 85025 COMPLETE CBC W/AUTO DIFF WBC: CPT

## 2024-05-03 PROCEDURE — 80061 LIPID PANEL: CPT

## 2024-05-03 PROCEDURE — 84443 ASSAY THYROID STIM HORMONE: CPT

## 2024-05-03 PROCEDURE — 83735 ASSAY OF MAGNESIUM: CPT

## 2024-05-03 PROCEDURE — G0103 PSA SCREENING: HCPCS

## 2024-05-16 ENCOUNTER — OFFICE VISIT (OUTPATIENT)
Dept: PRIMARY CARE | Facility: CLINIC | Age: 74
End: 2024-05-16
Payer: MEDICARE

## 2024-05-16 VITALS
HEIGHT: 74 IN | OXYGEN SATURATION: 97 % | WEIGHT: 186 LBS | DIASTOLIC BLOOD PRESSURE: 71 MMHG | BODY MASS INDEX: 23.87 KG/M2 | TEMPERATURE: 97.9 F | SYSTOLIC BLOOD PRESSURE: 138 MMHG | HEART RATE: 84 BPM | RESPIRATION RATE: 18 BRPM

## 2024-05-16 DIAGNOSIS — I10 ESSENTIAL HYPERTENSION: ICD-10-CM

## 2024-05-16 DIAGNOSIS — Z71.89 ADVANCED DIRECTIVES, COUNSELING/DISCUSSION: ICD-10-CM

## 2024-05-16 DIAGNOSIS — R97.20 ELEVATED PROSTATE SPECIFIC ANTIGEN LESS THAN 10 NG/ML: ICD-10-CM

## 2024-05-16 DIAGNOSIS — Z00.00 ROUTINE GENERAL MEDICAL EXAMINATION AT HEALTH CARE FACILITY: Primary | ICD-10-CM

## 2024-05-16 DIAGNOSIS — Z11.59 ENCOUNTER FOR HEPATITIS C SCREENING TEST FOR LOW RISK PATIENT: ICD-10-CM

## 2024-05-16 DIAGNOSIS — Z23 ENCOUNTER FOR IMMUNIZATION: ICD-10-CM

## 2024-05-16 PROCEDURE — 99497 ADVNCD CARE PLAN 30 MIN: CPT | Performed by: FAMILY MEDICINE

## 2024-05-16 PROCEDURE — 3078F DIAST BP <80 MM HG: CPT | Performed by: FAMILY MEDICINE

## 2024-05-16 PROCEDURE — G0439 PPPS, SUBSEQ VISIT: HCPCS | Performed by: FAMILY MEDICINE

## 2024-05-16 PROCEDURE — 1158F ADVNC CARE PLAN TLK DOCD: CPT | Performed by: FAMILY MEDICINE

## 2024-05-16 PROCEDURE — 1123F ACP DISCUSS/DSCN MKR DOCD: CPT | Performed by: FAMILY MEDICINE

## 2024-05-16 PROCEDURE — 1170F FXNL STATUS ASSESSED: CPT | Performed by: FAMILY MEDICINE

## 2024-05-16 PROCEDURE — 1159F MED LIST DOCD IN RCRD: CPT | Performed by: FAMILY MEDICINE

## 2024-05-16 PROCEDURE — 1160F RVW MEDS BY RX/DR IN RCRD: CPT | Performed by: FAMILY MEDICINE

## 2024-05-16 PROCEDURE — 99214 OFFICE O/P EST MOD 30 MIN: CPT | Performed by: FAMILY MEDICINE

## 2024-05-16 PROCEDURE — 1036F TOBACCO NON-USER: CPT | Performed by: FAMILY MEDICINE

## 2024-05-16 PROCEDURE — 3075F SYST BP GE 130 - 139MM HG: CPT | Performed by: FAMILY MEDICINE

## 2024-05-16 RX ORDER — AMLODIPINE BESYLATE 10 MG/1
10 TABLET ORAL DAILY
Qty: 100 TABLET | Refills: 3 | Status: SHIPPED | OUTPATIENT
Start: 2024-05-16

## 2024-05-16 RX ORDER — VALSARTAN 160 MG/1
160 TABLET ORAL DAILY
Qty: 90 TABLET | Refills: 3 | Status: SHIPPED | OUTPATIENT
Start: 2024-05-16 | End: 2025-05-11

## 2024-05-16 ASSESSMENT — ENCOUNTER SYMPTOMS
DYSURIA: 0
NERVOUS/ANXIOUS: 0
TROUBLE SWALLOWING: 0
FATIGUE: 0
BRUISES/BLEEDS EASILY: 0
PALPITATIONS: 0
SINUS PRESSURE: 0
JOINT SWELLING: 0
CONSTIPATION: 0
MYALGIAS: 0
CHOKING: 0
NAUSEA: 0
SLEEP DISTURBANCE: 0
SORE THROAT: 0
AGITATION: 0
DIAPHORESIS: 0
FLANK PAIN: 0
SHORTNESS OF BREATH: 0
CONFUSION: 0
CHILLS: 0
DIZZINESS: 0
WHEEZING: 0
HALLUCINATIONS: 0
WOUND: 0
FACIAL ASYMMETRY: 0
DYSPHORIC MOOD: 0
VOICE CHANGE: 0
ACTIVITY CHANGE: 0
ADENOPATHY: 0
ARTHRALGIAS: 0
DIARRHEA: 0
EYE PAIN: 0
TREMORS: 0
LOSS OF SENSATION IN FEET: 0
HEADACHES: 0
PHOTOPHOBIA: 0
EYE REDNESS: 0
UNEXPECTED WEIGHT CHANGE: 0
SEIZURES: 0
CHEST TIGHTNESS: 0
NUMBNESS: 0
POLYDIPSIA: 0
BLOOD IN STOOL: 0
HYPERTENSION: 1
HEMATURIA: 0
VOMITING: 0
EYE ITCHING: 0
SPEECH DIFFICULTY: 0
DEPRESSION: 0
OCCASIONAL FEELINGS OF UNSTEADINESS: 0
FREQUENCY: 0
BACK PAIN: 0
ABDOMINAL PAIN: 0
NECK STIFFNESS: 0
LIGHT-HEADEDNESS: 0
RHINORRHEA: 0
NECK PAIN: 0
WEAKNESS: 0
EYE DISCHARGE: 0
ABDOMINAL DISTENTION: 0
COUGH: 0
FEVER: 0
APPETITE CHANGE: 0

## 2024-05-16 ASSESSMENT — ACTIVITIES OF DAILY LIVING (ADL)
BATHING: INDEPENDENT
GROCERY_SHOPPING: INDEPENDENT
DOING_HOUSEWORK: INDEPENDENT
TAKING_MEDICATION: INDEPENDENT
MANAGING_FINANCES: INDEPENDENT
DRESSING: INDEPENDENT

## 2024-05-16 ASSESSMENT — PATIENT HEALTH QUESTIONNAIRE - PHQ9
2. FEELING DOWN, DEPRESSED OR HOPELESS: NOT AT ALL
1. LITTLE INTEREST OR PLEASURE IN DOING THINGS: NOT AT ALL
SUM OF ALL RESPONSES TO PHQ9 QUESTIONS 1 AND 2: 0

## 2024-05-16 NOTE — PROGRESS NOTES
"Subjective   Reason for Visit: Jatinder Estrada is an 73 y.o. male here for a Medicare Wellness visit.     Past Medical, Surgical, and Family History reviewed and updated in chart.    Reviewed all medications by prescribing practitioner or clinical pharmacist (such as prescriptions, OTCs, herbal therapies and supplements) and documented in the medical record.    HPI    Patient Care Team:  Buddy Salvador DO as PCP - General  Buddy Salvador DO as PCP - [employer] PCP     Review of Systems    Objective   Vitals:  /84 (BP Location: Right arm, Patient Position: Sitting, BP Cuff Size: Large adult)   Pulse 84   Temp 36.6 °C (97.9 °F) (Temporal)   Resp 18   Ht 1.867 m (6' 1.5\")   Wt 84.4 kg (186 lb)   SpO2 97%   BMI 24.21 kg/m²       Physical Exam    Assessment/Plan   Problem List Items Addressed This Visit       Essential hypertension    Sleep apnea treated with continuous positive airway pressure (CPAP)    Benign prostatic hyperplasia with urinary frequency     Other Visit Diagnoses       Encounter for immunization        Screening for prostate cancer        Routine general medical examination at health care facility                   "

## 2024-05-16 NOTE — PROGRESS NOTES
Subjective   Reason for Visit: Jatinder Estrada is an 73 y.o. male here for a Medicare Wellness visit.     Past Medical, Surgical, and Family History reviewed and updated in chart.    Reviewed all medications by prescribing practitioner or clinical pharmacist (such as prescriptions, OTCs, herbal therapies and supplements) and documented in the medical record.    Hypertension  This is a chronic problem. The current episode started more than 1 year ago. The problem is unchanged. The problem is controlled. Pertinent negatives include no chest pain, headaches, neck pain, palpitations or shortness of breath. There are no associated agents to hypertension. Risk factors for coronary artery disease include male gender. Past treatments include calcium channel blockers, angiotensin blockers and alpha 1 blockers. The current treatment provides significant improvement. There are no compliance problems.  There is no history of a hypertension causing med or a thyroid problem.       Patient Care Team:  Buddy Salvador DO as PCP - General  Buddy Salvador DO as PCP - [employer] PCP     Review of Systems   Constitutional:  Negative for activity change, appetite change, chills, diaphoresis, fatigue, fever and unexpected weight change.   HENT:  Negative for congestion, ear pain, hearing loss, nosebleeds, postnasal drip, rhinorrhea, sinus pressure, sneezing, sore throat, tinnitus, trouble swallowing and voice change.    Eyes:  Negative for photophobia, pain, discharge, redness, itching and visual disturbance.   Respiratory:  Negative for cough, choking, chest tightness, shortness of breath and wheezing.    Cardiovascular:  Negative for chest pain, palpitations and leg swelling.   Gastrointestinal:  Negative for abdominal distention, abdominal pain, blood in stool, constipation, diarrhea, nausea and vomiting.   Endocrine: Negative for cold intolerance, heat intolerance, polydipsia and polyuria.   Genitourinary:  Negative  "for dysuria, flank pain, frequency, hematuria and urgency.   Musculoskeletal:  Negative for arthralgias, back pain, joint swelling, myalgias, neck pain and neck stiffness.   Skin:  Negative for rash and wound.   Allergic/Immunologic: Negative for immunocompromised state.   Neurological:  Negative for dizziness, tremors, seizures, syncope, facial asymmetry, speech difficulty, weakness, light-headedness, numbness and headaches.   Hematological:  Negative for adenopathy. Does not bruise/bleed easily.   Psychiatric/Behavioral:  Negative for agitation, behavioral problems, confusion, dysphoric mood, hallucinations, self-injury, sleep disturbance and suicidal ideas. The patient is not nervous/anxious.        Objective   Vitals:  /71 (BP Location: Right arm, Patient Position: Sitting, BP Cuff Size: Large adult)   Pulse 84   Temp 36.6 °C (97.9 °F) (Temporal)   Resp 18   Ht 1.867 m (6' 1.5\")   Wt 84.4 kg (186 lb)   SpO2 97%   BMI 24.21 kg/m²       Physical Exam  Constitutional:       General: He is not in acute distress.     Appearance: He is not ill-appearing or diaphoretic.   HENT:      Head: Normocephalic and atraumatic.      Right Ear: External ear normal.      Left Ear: External ear normal.      Nose: Nose normal. No rhinorrhea.   Eyes:      General: Lids are normal. No scleral icterus.        Right eye: No discharge.         Left eye: No discharge.      Conjunctiva/sclera: Conjunctivae normal.   Cardiovascular:      Rate and Rhythm: Normal rate and regular rhythm.      Pulses: Normal pulses.      Heart sounds: Murmur heard.      Systolic murmur is present with a grade of 2/6.   Pulmonary:      Effort: Pulmonary effort is normal. No respiratory distress.      Breath sounds: No decreased breath sounds, wheezing, rhonchi or rales.   Abdominal:      General: Bowel sounds are normal. There is no distension.      Palpations: Abdomen is soft. There is no mass.      Tenderness: There is no abdominal tenderness. " There is no guarding or rebound.   Musculoskeletal:         General: No swelling, tenderness or deformity.      Cervical back: No rigidity or tenderness.      Right lower leg: No edema.      Left lower leg: No edema.   Lymphadenopathy:      Cervical: No cervical adenopathy.      Upper Body:      Right upper body: No supraclavicular adenopathy.      Left upper body: No supraclavicular adenopathy.   Skin:     General: Skin is warm and dry.      Coloration: Skin is not jaundiced or pale.      Findings: No erythema, lesion or rash.   Neurological:      General: No focal deficit present.      Mental Status: He is alert and oriented to person, place, and time.      Sensory: No sensory deficit.      Motor: No weakness or tremor.      Coordination: Coordination normal.      Gait: Gait normal.   Psychiatric:         Mood and Affect: Mood normal. Affect is not inappropriate.         Behavior: Behavior normal.         Assessment/Plan   Problem List Items Addressed This Visit       Essential hypertension    Relevant Medications    valsartan (Diovan) 160 mg tablet    amLODIPine (Norvasc) 10 mg tablet    Other Relevant Orders    CBC and Auto Differential    Comprehensive Metabolic Panel    Lipid Panel    Magnesium    TSH with reflex to Free T4 if abnormal    Follow Up In Primary Care - Medicare Annual     Other Visit Diagnoses       Routine general medical examination at health care facility    -  Primary    Relevant Orders    Follow Up In Primary Care - Medicare Annual    Encounter for immunization        Relevant Medications    respiratory syncytial virus, RSV, vaccine, adjuvanted, age 60y+ (Arexvy) 120 mcg/0.5 mL suspension for reconstitution    Other Relevant Orders    Follow Up In Primary Care - Medicare Annual    Advanced directives, counseling/discussion        Relevant Orders    Full code (Completed)    Follow Up In Primary Care - Medicare Annual    Elevated prostate specific antigen less than 10 ng/ml        Relevant  Orders    Referral to Urology    Follow Up In Primary Care - Medicare Annual    Encounter for hepatitis C screening test for low risk patient        Relevant Orders    Hepatitis C antibody    Follow Up In Primary Care - Medicare Annual        Advance care planning was discussed including living will, power of  for healthcare and living will.  Advance care planning packet will be provided to patient at discharge.  Patient was encouraged to bring in any advanced care planning paperwork to file on the chart at their own convenience.  (~16min spent discussing above)

## 2024-07-07 DIAGNOSIS — N40.1 BENIGN PROSTATIC HYPERPLASIA WITH URINARY FREQUENCY: ICD-10-CM

## 2024-07-07 DIAGNOSIS — R35.0 BENIGN PROSTATIC HYPERPLASIA WITH URINARY FREQUENCY: ICD-10-CM

## 2024-07-08 RX ORDER — DUTASTERIDE 0.5 MG/1
0.5 CAPSULE, LIQUID FILLED ORAL DAILY
Qty: 100 CAPSULE | Refills: 2 | Status: SHIPPED | OUTPATIENT
Start: 2024-07-08

## 2024-08-09 DIAGNOSIS — R35.0 BENIGN PROSTATIC HYPERPLASIA WITH URINARY FREQUENCY: ICD-10-CM

## 2024-08-09 DIAGNOSIS — N40.1 BENIGN PROSTATIC HYPERPLASIA WITH URINARY FREQUENCY: ICD-10-CM

## 2024-08-10 RX ORDER — TERAZOSIN 2 MG/1
2 CAPSULE ORAL NIGHTLY
Qty: 100 CAPSULE | Refills: 2 | Status: SHIPPED | OUTPATIENT
Start: 2024-08-10

## 2024-08-16 ENCOUNTER — APPOINTMENT (OUTPATIENT)
Dept: UROLOGY | Facility: CLINIC | Age: 74
End: 2024-08-16
Payer: MEDICARE

## 2024-08-16 VITALS
BODY MASS INDEX: 23.87 KG/M2 | HEART RATE: 76 BPM | DIASTOLIC BLOOD PRESSURE: 76 MMHG | SYSTOLIC BLOOD PRESSURE: 143 MMHG | HEIGHT: 74 IN | WEIGHT: 186 LBS

## 2024-08-16 DIAGNOSIS — R35.0 BENIGN PROSTATIC HYPERPLASIA WITH URINARY FREQUENCY: Primary | ICD-10-CM

## 2024-08-16 DIAGNOSIS — N40.1 BENIGN PROSTATIC HYPERPLASIA WITH URINARY FREQUENCY: Primary | ICD-10-CM

## 2024-08-16 DIAGNOSIS — R97.20 ELEVATED PROSTATE SPECIFIC ANTIGEN LESS THAN 10 NG/ML: ICD-10-CM

## 2024-08-16 PROCEDURE — 1123F ACP DISCUSS/DSCN MKR DOCD: CPT | Performed by: UROLOGY

## 2024-08-16 PROCEDURE — 1159F MED LIST DOCD IN RCRD: CPT | Performed by: UROLOGY

## 2024-08-16 PROCEDURE — 3008F BODY MASS INDEX DOCD: CPT | Performed by: UROLOGY

## 2024-08-16 PROCEDURE — 3078F DIAST BP <80 MM HG: CPT | Performed by: UROLOGY

## 2024-08-16 PROCEDURE — 99204 OFFICE O/P NEW MOD 45 MIN: CPT | Performed by: UROLOGY

## 2024-08-16 PROCEDURE — 3077F SYST BP >= 140 MM HG: CPT | Performed by: UROLOGY

## 2024-08-16 PROCEDURE — 1160F RVW MEDS BY RX/DR IN RCRD: CPT | Performed by: UROLOGY

## 2024-08-16 PROCEDURE — 1036F TOBACCO NON-USER: CPT | Performed by: UROLOGY

## 2024-08-16 PROCEDURE — G2211 COMPLEX E/M VISIT ADD ON: HCPCS | Performed by: UROLOGY

## 2024-08-16 NOTE — PROGRESS NOTES
PRIOR NOTES  74-year-old male seeing me regarding elevated PSA  PMH: CKD 2, BPH with urinary symptoms, JAMISON on CPAP, hypertension  Family history: no Fhx breast or prostate ca  Retired, former [employer] employee    PSA history:  05/24: 7.67 (15.4 on dutasteride)  05/22: 3.30    Pt takes terazosin and dutasteride for ~1y    Endorses severe LUTS, most bothersome is sensation of incomplete emptying or double  voiding  Endorses: weak stream, occasional intermittency, some straining, occasional urge, rare urge incontinence with dribbling (1x/wk)  Denies: hesitancy, frequency  NTF: 0-1x    Past Medical History  He has a past medical history of Acute nasopharyngitis (common cold) (11/18/2021), Contact with and (suspected) exposure to covid-19 (11/18/2021), Encounter for immunization (09/30/2021), and Nasal congestion (11/18/2021).    Surgical History  He has a past surgical history that includes Other surgical history (08/20/2019); Other surgical history (08/20/2019); and Other surgical history (08/20/2019).     Social History  He reports that he has never smoked. He has never used smokeless tobacco. He reports current alcohol use. He reports that he does not currently use drugs.    Family History  Family History   Problem Relation Name Age of Onset    Diabetes Mother      Heart disease Father      Diabetes Sister x2     No Known Problems Brother x1     Diabetes Brother x1         Allergies  Patient has no known allergies.    ROS: 12 system review was completed and is negative with the exception of those signs and symptoms noted in the history of present illness: A 12 system review was completed and is negative with the exception of those signs and symptoms noted in the history of present illness.     Exam:  General: in NAD, appears stated age  Head: normocephalic, atraumatic  Respiratory: normal effort, no use of accessory muscles  Cardiovascular: no edema noted  Skin: normal turgor, no rashes  Neurologic: grossly intact,  "oriented to person/place/time  Psychiatric: mode and affect appropriate  Abdomen: Soft, nontender, no surgical scars  : Circumcised, orthotopic patent meatus  Digital rectal exam-prostate not markedly enlarged, no induration or nodules     Last Recorded Vitals  Blood pressure 143/76, pulse 76, height 1.867 m (6' 1.5\"), weight 84.4 kg (186 lb).    Lab Results   Component Value Date    PSASCREEN 7.67 (H) 05/03/2024    CREATININE 1.28 05/03/2024    HGB 15.0 05/03/2024         ASSESSMENT/PLAN:  # Elevated PSA  -If patient is truly been on Avodart x 1 year, his PSA is quite concerning  -Recommend repeat PSA, advised him to wait a week since I did rectal exam today, avoid sexual activity, long bike rides, straining for bowel movements within 24 hours before hand  -I recommended an MRI of his prostate, plus or minus biopsy based on MRI    # Lower urinary tract symptoms  -For now, continue terazosin and dutasteride  -Likely will recommend switching off dutasteride after PSA is evaluated    Benito Bowman MD    "

## 2024-08-28 ENCOUNTER — HOSPITAL ENCOUNTER (OUTPATIENT)
Dept: RADIOLOGY | Facility: HOSPITAL | Age: 74
Discharge: HOME | End: 2024-08-28
Payer: MEDICARE

## 2024-08-28 DIAGNOSIS — R97.20 ELEVATED PROSTATE SPECIFIC ANTIGEN LESS THAN 10 NG/ML: ICD-10-CM

## 2024-08-28 PROCEDURE — A9575 INJ GADOTERATE MEGLUMI 0.1ML: HCPCS | Performed by: UROLOGY

## 2024-08-28 PROCEDURE — 2550000001 HC RX 255 CONTRASTS: Performed by: UROLOGY

## 2024-08-28 PROCEDURE — 72197 MRI PELVIS W/O & W/DYE: CPT

## 2024-08-28 RX ORDER — GADOTERATE MEGLUMINE 376.9 MG/ML
0.2 INJECTION INTRAVENOUS
Status: COMPLETED | OUTPATIENT
Start: 2024-08-28 | End: 2024-08-28

## 2024-09-17 DIAGNOSIS — R97.20 ELEVATED PSA: ICD-10-CM

## 2024-10-08 ENCOUNTER — HOSPITAL ENCOUNTER (OUTPATIENT)
Facility: CLINIC | Age: 74
Setting detail: OUTPATIENT SURGERY
Discharge: HOME | End: 2024-10-08
Attending: UROLOGY | Admitting: UROLOGY
Payer: MEDICARE

## 2024-10-08 ENCOUNTER — ANESTHESIA EVENT (OUTPATIENT)
Dept: OPERATING ROOM | Facility: CLINIC | Age: 74
End: 2024-10-08
Payer: MEDICARE

## 2024-10-08 ENCOUNTER — ANESTHESIA (OUTPATIENT)
Dept: OPERATING ROOM | Facility: CLINIC | Age: 74
End: 2024-10-08
Payer: MEDICARE

## 2024-10-08 VITALS
DIASTOLIC BLOOD PRESSURE: 91 MMHG | WEIGHT: 185.85 LBS | OXYGEN SATURATION: 95 % | SYSTOLIC BLOOD PRESSURE: 147 MMHG | TEMPERATURE: 97 F | HEIGHT: 73 IN | HEART RATE: 87 BPM | BODY MASS INDEX: 24.63 KG/M2 | RESPIRATION RATE: 16 BRPM

## 2024-10-08 DIAGNOSIS — R97.20 ELEVATED PSA: ICD-10-CM

## 2024-10-08 PROCEDURE — C1819 TISSUE LOCALIZATION-EXCISION: HCPCS | Performed by: UROLOGY

## 2024-10-08 PROCEDURE — 99100 ANES PT EXTEME AGE<1 YR&>70: CPT | Performed by: ANESTHESIOLOGY

## 2024-10-08 PROCEDURE — 2500000001 HC RX 250 WO HCPCS SELF ADMINISTERED DRUGS (ALT 637 FOR MEDICARE OP)

## 2024-10-08 PROCEDURE — 2500000004 HC RX 250 GENERAL PHARMACY W/ HCPCS (ALT 636 FOR OP/ED)

## 2024-10-08 PROCEDURE — A55700 PR BIOPSY OF PROSTATE,NEEDLE/PUNCH: Performed by: NURSE ANESTHETIST, CERTIFIED REGISTERED

## 2024-10-08 PROCEDURE — 55700 PR PROSTATE NEEDLE BIOPSY ANY APPROACH: CPT | Performed by: UROLOGY

## 2024-10-08 PROCEDURE — 7100000010 HC PHASE TWO TIME - EACH INCREMENTAL 1 MINUTE: Performed by: UROLOGY

## 2024-10-08 PROCEDURE — G0416 PROSTATE BIOPSY, ANY MTHD: HCPCS | Performed by: PATHOLOGY

## 2024-10-08 PROCEDURE — A55700 PR BIOPSY OF PROSTATE,NEEDLE/PUNCH: Performed by: ANESTHESIOLOGY

## 2024-10-08 PROCEDURE — 3700000001 HC GENERAL ANESTHESIA TIME - INITIAL BASE CHARGE: Performed by: UROLOGY

## 2024-10-08 PROCEDURE — 3700000002 HC GENERAL ANESTHESIA TIME - EACH INCREMENTAL 1 MINUTE: Performed by: UROLOGY

## 2024-10-08 PROCEDURE — 3600000004 HC OR TIME - INITIAL BASE CHARGE - PROCEDURE LEVEL FOUR: Performed by: UROLOGY

## 2024-10-08 PROCEDURE — 2500000005 HC RX 250 GENERAL PHARMACY W/O HCPCS: Performed by: UROLOGY

## 2024-10-08 PROCEDURE — 3600000009 HC OR TIME - EACH INCREMENTAL 1 MINUTE - PROCEDURE LEVEL FOUR: Performed by: UROLOGY

## 2024-10-08 PROCEDURE — 2500000004 HC RX 250 GENERAL PHARMACY W/ HCPCS (ALT 636 FOR OP/ED): Mod: JG | Performed by: UROLOGY

## 2024-10-08 PROCEDURE — G0416 PROSTATE BIOPSY, ANY MTHD: HCPCS | Mod: TC,SUR,ELYLAB | Performed by: UROLOGY

## 2024-10-08 PROCEDURE — 2720000007 HC OR 272 NO HCPCS: Performed by: UROLOGY

## 2024-10-08 PROCEDURE — 76942 ECHO GUIDE FOR BIOPSY: CPT | Performed by: UROLOGY

## 2024-10-08 PROCEDURE — 7100000009 HC PHASE TWO TIME - INITIAL BASE CHARGE: Performed by: UROLOGY

## 2024-10-08 RX ORDER — LIDOCAINE HYDROCHLORIDE 20 MG/ML
INJECTION, SOLUTION INFILTRATION; PERINEURAL AS NEEDED
Status: DISCONTINUED | OUTPATIENT
Start: 2024-10-08 | End: 2024-10-08

## 2024-10-08 RX ORDER — SODIUM CHLORIDE, SODIUM LACTATE, POTASSIUM CHLORIDE, CALCIUM CHLORIDE 600; 310; 30; 20 MG/100ML; MG/100ML; MG/100ML; MG/100ML
100 INJECTION, SOLUTION INTRAVENOUS CONTINUOUS
Status: CANCELLED | OUTPATIENT
Start: 2024-10-08

## 2024-10-08 RX ORDER — BUPIVACAINE HYDROCHLORIDE 7.5 MG/ML
INJECTION, SOLUTION EPIDURAL; RETROBULBAR AS NEEDED
Status: DISCONTINUED | OUTPATIENT
Start: 2024-10-08 | End: 2024-10-08 | Stop reason: HOSPADM

## 2024-10-08 RX ORDER — MIDAZOLAM HYDROCHLORIDE 1 MG/ML
INJECTION, SOLUTION INTRAMUSCULAR; INTRAVENOUS AS NEEDED
Status: DISCONTINUED | OUTPATIENT
Start: 2024-10-08 | End: 2024-10-08

## 2024-10-08 RX ORDER — SODIUM CHLORIDE 0.9 G/100ML
IRRIGANT IRRIGATION AS NEEDED
Status: DISCONTINUED | OUTPATIENT
Start: 2024-10-08 | End: 2024-10-08 | Stop reason: HOSPADM

## 2024-10-08 RX ORDER — SODIUM CHLORIDE, SODIUM LACTATE, POTASSIUM CHLORIDE, CALCIUM CHLORIDE 600; 310; 30; 20 MG/100ML; MG/100ML; MG/100ML; MG/100ML
100 INJECTION, SOLUTION INTRAVENOUS CONTINUOUS
Status: DISCONTINUED | OUTPATIENT
Start: 2024-10-08 | End: 2024-10-08 | Stop reason: HOSPADM

## 2024-10-08 RX ORDER — PROPOFOL 10 MG/ML
INJECTION, EMULSION INTRAVENOUS CONTINUOUS PRN
Status: DISCONTINUED | OUTPATIENT
Start: 2024-10-08 | End: 2024-10-08

## 2024-10-08 RX ORDER — ONDANSETRON HYDROCHLORIDE 2 MG/ML
4 INJECTION, SOLUTION INTRAVENOUS ONCE AS NEEDED
Status: DISCONTINUED | OUTPATIENT
Start: 2024-10-08 | End: 2024-10-08 | Stop reason: HOSPADM

## 2024-10-08 RX ORDER — ACETAMINOPHEN 325 MG/1
TABLET ORAL AS NEEDED
Status: DISCONTINUED | OUTPATIENT
Start: 2024-10-08 | End: 2024-10-08

## 2024-10-08 RX ORDER — SODIUM CHLORIDE, SODIUM LACTATE, POTASSIUM CHLORIDE, CALCIUM CHLORIDE 600; 310; 30; 20 MG/100ML; MG/100ML; MG/100ML; MG/100ML
INJECTION, SOLUTION INTRAVENOUS CONTINUOUS PRN
Status: DISCONTINUED | OUTPATIENT
Start: 2024-10-08 | End: 2024-10-08

## 2024-10-08 RX ORDER — CEFAZOLIN 1 G/1
INJECTION, POWDER, FOR SOLUTION INTRAVENOUS AS NEEDED
Status: DISCONTINUED | OUTPATIENT
Start: 2024-10-08 | End: 2024-10-08

## 2024-10-08 RX ORDER — LIDOCAINE HYDROCHLORIDE 10 MG/ML
0.1 INJECTION, SOLUTION EPIDURAL; INFILTRATION; INTRACAUDAL; PERINEURAL ONCE
Status: DISCONTINUED | OUTPATIENT
Start: 2024-10-08 | End: 2024-10-08 | Stop reason: HOSPADM

## 2024-10-08 RX ORDER — FENTANYL CITRATE 50 UG/ML
INJECTION, SOLUTION INTRAMUSCULAR; INTRAVENOUS AS NEEDED
Status: DISCONTINUED | OUTPATIENT
Start: 2024-10-08 | End: 2024-10-08

## 2024-10-08 SDOH — HEALTH STABILITY: MENTAL HEALTH: CURRENT SMOKER: 0

## 2024-10-08 ASSESSMENT — COLUMBIA-SUICIDE SEVERITY RATING SCALE - C-SSRS
6. HAVE YOU EVER DONE ANYTHING, STARTED TO DO ANYTHING, OR PREPARED TO DO ANYTHING TO END YOUR LIFE?: NO
1. IN THE PAST MONTH, HAVE YOU WISHED YOU WERE DEAD OR WISHED YOU COULD GO TO SLEEP AND NOT WAKE UP?: NO
2. HAVE YOU ACTUALLY HAD ANY THOUGHTS OF KILLING YOURSELF?: NO

## 2024-10-08 ASSESSMENT — PAIN - FUNCTIONAL ASSESSMENT
PAIN_FUNCTIONAL_ASSESSMENT: 0-10

## 2024-10-08 ASSESSMENT — PAIN SCALES - GENERAL
PAINLEVEL_OUTOF10: 0 - NO PAIN

## 2024-10-08 NOTE — H&P
History Of Present Illness  Jatinder Estrada is a 74 y.o. male  here for transperineal biopsy.     Previous Documentation 8/16:   PMH: CKD 2, BPH with urinary symptoms, JAMISON on CPAP, hypertension  Family history: no Fhx breast or prostate ca  Retired, former [employer] employee     PSA history:  05/24: 7.67 (15.4 on dutasteride)  05/22: 3.30     Pt takes terazosin and dutasteride for ~1y     Endorses severe LUTS, most bothersome is sensation of incomplete emptying or double  voiding  Endorses: weak stream, occasional intermittency, some straining, occasional urge, rare urge incontinence with dribbling (1x/wk)  Denies: hesitancy, frequency  NTF: 0-1x      Past Medical History  Past Medical History:   Diagnosis Date    Acute nasopharyngitis (common cold) 11/18/2021    Nasopharyngitis    Contact with and (suspected) exposure to covid-19 11/18/2021    Suspected COVID-19 virus infection    Encounter for immunization 09/30/2021    Encounter for immunization    Nasal congestion 11/18/2021    Nasal congestion with rhinorrhea    Refusal of blood product        Surgical History  Past Surgical History:   Procedure Laterality Date    OTHER SURGICAL HISTORY  08/20/2019    Phacoemulsification of cataract and insertion of intraocular lens    OTHER SURGICAL HISTORY  08/20/2019    Hemorrhoidectomy    OTHER SURGICAL HISTORY  08/20/2019    Colonoscopy        Social History  He reports that he has never smoked. He has never used smokeless tobacco. He reports current alcohol use. He reports that he does not currently use drugs.    Family History  Family History   Problem Relation Name Age of Onset    Diabetes Mother      Heart disease Father      Diabetes Sister x2     No Known Problems Brother x1     Diabetes Brother x1         Allergies  Patient has no known allergies.    Review of Systems   All other systems reviewed and are negative.       Physical Exam  General: in NAD, appears stated age  Head: normocephalic, atraumatic  Respiratory:  "normal effort, no use of accessory muscles  Cardiovascular: no edema noted  Skin: normal turgor, no rashes  Neurologic: grossly intact, oriented to person/place/time  Psychiatric: mode and affect appropriate  Abdomen: Soft, nontender, no surgical scars  : Circumcised, orthotopic patent meatus    Last Recorded Vitals  Blood pressure (!) 181/96, pulse (!) 16, temperature 36 °C (96.8 °F), temperature source Skin, resp. rate 16, height 1.854 m (6' 1\"), weight 84.3 kg (185 lb 13.6 oz), SpO2 98%.    Relevant Results  Current Outpatient Medications   Medication Instructions    amLODIPine (NORVASC) 10 mg, oral, Daily    cholecalciferol (Vitamin D-3) 50 MCG (2000 UT) tablet oral    diaper,brief,adult,disposable (Depend Easy Fit Undergarments) misc 3 times daily as needed    dutasteride (AVODART) 0.5 mg, oral, Daily    terazosin (HYTRIN) 2 mg, oral, Nightly    valsartan (DIOVAN) 160 mg, oral, Daily      No results found for this or any previous visit (from the past 96 hour(s)).             15.0     3.6>-----<271              46.3   141  107  11                  ----------------<99     4.6  30  1.28          Ca 9.2 Phos No results found for requested labs within last 365 days. Mg 1.97       ALT 12 AST 14 AlkPhos 55 tBili 0.9          No results found.        Assessment/Plan   Assessment & Plan  Elevated PSA    Jatinder Estrada is a 74 y.o. male  here for transperineal biopsy.       Plan:   Okay to proceed with transperineal prostate biopsy.    Patient seen and discussed with attending Dr. Bowman.      Kade Hinton MD    "

## 2024-10-08 NOTE — ANESTHESIA PREPROCEDURE EVALUATION
Patient: Jatinder Estrada    Procedure Information       Date/Time: 10/08/24 1030    Procedure: Transperieneal Prostate Biopsy with URO-Chadd (Prostate)    [place] [facility] [place] [facility] [place] [facility] [place] [facility]    Surgeons: Benito Bowman MD            Relevant Problems   Cardiac   (+) Essential hypertension      /Renal   (+) Benign prostatic hyperplasia with urinary frequency       Clinical information reviewed:   Tobacco  Allergies  Meds   Med Hx  Surg Hx   Fam Hx  Soc Hx        NPO Detail:  NPO/Void Status  Carbohydrate Drink Given Prior to Surgery? : N  Date of Last Liquid: 10/08/24  Time of Last Liquid: 0730 (sip h2o with morning meds)  Date of Last Solid: 10/07/24  Time of Last Solid: 2030  Last Intake Type: Clear fluids  Time of Last Void: 0836         Physical Exam    Airway  Mallampati: I  TM distance: >3 FB  Neck ROM: full     Cardiovascular   Rhythm: regular  Rate: normal     Dental   (+) lower dentures     Pulmonary   Breath sounds clear to auscultation     Abdominal   Abdomen: soft             Anesthesia Plan    History of general anesthesia?: no  History of complications of general anesthesia?: no    ASA 2     MAC     The patient is not a current smoker.    intravenous induction   Anesthetic plan and risks discussed with patient.    Plan discussed with attending.      
The patient is a 31y Female complaining of chest pain.

## 2024-10-08 NOTE — ANESTHESIA POSTPROCEDURE EVALUATION
Patient: Jatinder Estrada    Procedure Summary       Date: 10/08/24 Room / [place] [facility] [place] [facility] [place] [facility] [place] [facility]    Anesthesia Start: 1044 Anesthesia Stop: 1117    Procedure: Transperieneal Prostate Biopsy with URO-Chadd (Prostate) Diagnosis:       Elevated PSA      (Elevated PSA [R97.20])    Surgeons: Benito Bowman MD Responsible Provider: David Willis MD    Anesthesia Type: MAC ASA Status: 2            Anesthesia Type: MAC    Vitals Value Taken Time   /91 10/08/24 1145   Temp 36.1 °C (97 °F) 10/08/24 1145   Pulse 87 10/08/24 1145   Resp 16 10/08/24 1145   SpO2 95 % 10/08/24 1145       Anesthesia Post Evaluation    Patient location during evaluation: PACU  Patient participation: complete - patient participated  Level of consciousness: awake  Pain management: satisfactory to patient  Multimodal analgesia pain management approach  Airway patency: patent  Cardiovascular status: acceptable  Respiratory status: acceptable  Hydration status: stable  Postoperative Nausea and Vomiting: none  Comments: Did well    No notable events documented.

## 2024-10-08 NOTE — OP NOTE
Transperieneal Prostate Biopsy with URO-Chadd Operative Note     Date: 10/8/2024  OR Location: [facility] OR    Name: Jatinder Estrada, : 1950, Age: 74 y.o., MRN: [id number], Sex: male    Diagnosis  Pre-op Diagnosis      * Elevated PSA [R97.20] Post-op Diagnosis     * Elevated PSA [R97.20]     Procedures  Transperieneal Prostate Biopsy with URO-Chadd  50608 - NV PROSTATE NEEDLE BIOPSY ANY APPROACH      Surgeons      * Benito Bowman - Primary    Resident/Fellow/Other Assistant:  Surgeons and Role:  * No surgeons found with a matching role *    Procedure Summary  Anesthesia: Monitor Anesthesia Care  ASA: II  Anesthesia Staff: Anesthesiologist: David Willis MD  CRNA: REINIER Garner-CRNA  SRNA: Linda Mena  Estimated Blood Loss: 3mL  Intra-op Medications:   Administrations occurring from 1030 to 1115 on 10/08/24:   Medication Name Total Dose   bupivacaine PF (Marcaine) injection 0.75 % 30 mL   sodium chloride 0.9 % irrigation solution 250 mL              Anesthesia Record               Intraprocedure I/O Totals          Intake    Propofol Drip 0.00 mL    The total shown is the total volume documented since Anesthesia Start was filed.    LR infusion 150.00 mL    Total Intake 150 mL          Specimen:   ID Type Source Tests Collected by Time   1 : RIGHT POSTERIOR MEDIAL Tissue PROSTATE NEEDLE BIOPSY RIGHT SURGICAL PATHOLOGY EXAM Benito Bowman MD 10/8/2024 1013   2 : RIGHT ANTERIOR Tissue PROSTATE NEEDLE BIOPSY RIGHT SURGICAL PATHOLOGY EXAM Benito Bowman MD 10/8/2024 1013   3 : RIGHT BASE Tissue PROSTATE NEEDLE BIOPSY RIGHT SURGICAL PATHOLOGY EXAM Benito Bowman MD 10/8/2024 1013   4 : RIGHT POSTERIOR LATERAL Tissue PROSTATE NEEDLE BIOPSY RIGHT SURGICAL PATHOLOGY EXAM Benito Bowman MD 10/8/2024 1013   5 : LEFT ANTERIOR Tissue PROSTATE NEEDLE BIOPSY LEFT SURGICAL PATHOLOGY EXAM Benito Bowman MD 10/8/2024 1013   6 : LEFT BASE Tissue PROSTATE NEEDLE BIOPSY LEFT SURGICAL PATHOLOGY EXAM Benito HENRIQUEZ  MD [place] 10/8/2024 1013   7 : LEFT POSTERIOR LATERAL Tissue PROSTATE NEEDLE BIOPSY LEFT SURGICAL PATHOLOGY EXAM Benito Bowman MD 10/8/2024 1013   8 : LEFT POSTERIOR MEDIAL Tissue PROSTATE NEEDLE BIOPSY LEFT SURGICAL PATHOLOGY EXAM Benito Bowman MD 10/8/2024 1013   9 : Right Posterior DIDIER Tissue PROSTATE BIOPSY TARGETED DIDIER SURGICAL PATHOLOGY EXAM Benito Bowman MD 10/8/2024 1035        Staff:   Circulator: Mumtaz Coyne Person: Zahira Fajardo Circulator: Michelle    Operative Indications: 74 year old male w/ dx of concerning PSA, PSA 15, who presents for transperineal biopsies.  He underwent preoperative MRI of his prostate which revealed a suspicious lesion which was marked for MRI fusion biopsy.  The patient was counseled regarding the risks, benefits, alternatives, equipment, and personnel involved and consented to proceed with surgical intervention.    Operative Findings: Uncomplicated transperineal biopsy with MRI fusion guidance.  Clinically benign exam.  No marked hypoechoic area    Operative Procedure:   The patient was correctly identified and the operative plan was confirmed with the patient and the operative team. A weight appropriate dose of prophylactic antibiotics was administered intravenously prior to the procedure and sequential compression devices were applied to the lower extremities and activated prior to induction of anesthesia. The patient was placed in supine position.  Monitored anesthesia was induced. The patient was repositioned in dorsal lithotomy position. All pressure points were padded per protocol.    A digital rectal exam revealed: No palpable disease  Prostate size, 50 g with a large median lobe    The operative area was then prepped and draped in the usual sterile fashion.    The transrectal ultrasound probe was inserted into the rectum.  The perineal skin was infiltrated with 5 cc of 0.25% Marcaine plain on both sides of the perineal raphae.  A bilateral pudendal nerve block  was performed using the precision point guide.  Using a 22-gauge, 7 inch spinal needle 10 cc of 0.25% Marcaine were infiltrated on either side of the pelvic musculature towards the lateral aspect of the prostate as well as the needle tracks bilaterally.  In total, 30 cc of Marcaine were used.    There was a faint hypoechoic lesion that corresponded with the suspicious lesion seen on MRI.  MRI fusion was then performed using the [id number] device.  While prostate mapping was being done, the template biopsies were obtained.    An 18-gauge core biopsy needle was then used to obtain 2 biopsies from each site: Posterior medial, posterior lateral, prostate base, anterior prostate bilaterally.  In total, 16 biopsy cores were taken.    We then targeted the suspicious area using fusion technology and took several samples from this area.    Counts were correct. Sign-out was performed with the surgical team confirming the specimen listed below. The patient tolerated the procedure well, emerged from anesthesia without incident, and was transferred to PACU in stable condition.     GORDO (Benito Bowman MD) performed the procedure    Benito Bowman  Phone Number: [phone]

## 2024-10-08 NOTE — DISCHARGE INSTRUCTIONS
Post-Procedure Instructions for Transperineal Prostate Biopsy   Procedure Overview:   You have had a transperineal prostate biopsy.   We will call you to discuss your pathology results. Results typically take 7-10 business days. If you do not hear from us within 10 days, please reach out to us via the methods listed at the bottom of this handout.     What to Expect:   Blood-tinged urine with/without clots for 24-72 hours.   Blood in the ejaculate for 4-6 weeks.     When to Call:   Call your doctor immediately if you experience any of the following:    You are unable to urinate in 6-8 hours after the biopsy.    Fever above 101 degrees (F) or Chills    Passing excessive clots in urine or stool     Pain Control:   Tylenol should be adequate     General Instructions:    Resume normal diet.    If you take Aspirin, resume 3 days after biopsy if you do not see blood in your urine.    Drink 6-8 glasses of water the rest of the day to dilute the urine and to prevent clot formation.    No alcoholic beverages for 24 hours after your biopsy.    No straining or heavy lifting for 5 days after your biopsy.     DR. MAGANA'S CONTACT INFORMATION   For non-urgent issues, please send our office a message via [employer], this is often easier and more convenient for you than calling the office. You should have received an invitation to sign-up for [employer] in your email upon registration.   For appointments: [phone], option 1 -> option 3 -> option 9     For questions/issues/concerns during business hours: [phone] - this number will direct you to the voicemail for Dr. Magana's , which is frequently checked during business hours.   F  or after-hours issues: [phone], this will direct you to our answering service or the resident physician on call if needed.   For medical emergencies, please call 911 or proceed to your nearest emergency room. May have Tylenol after: 215pm

## 2024-10-18 LAB
LABORATORY COMMENT REPORT: NORMAL
PATH REPORT.FINAL DX SPEC: NORMAL
PATH REPORT.GROSS SPEC: NORMAL
PATH REPORT.RELEVANT HX SPEC: NORMAL
PATH REPORT.TOTAL CANCER: NORMAL

## 2024-10-22 DIAGNOSIS — C61 PROSTATE CANCER (MULTI): Primary | ICD-10-CM

## 2024-10-28 ENCOUNTER — APPOINTMENT (OUTPATIENT)
Dept: UROLOGY | Facility: CLINIC | Age: 74
End: 2024-10-28
Payer: MEDICARE

## 2024-10-28 VITALS
WEIGHT: 186.4 LBS | DIASTOLIC BLOOD PRESSURE: 84 MMHG | SYSTOLIC BLOOD PRESSURE: 174 MMHG | HEART RATE: 91 BPM | BODY MASS INDEX: 24.59 KG/M2 | TEMPERATURE: 98 F

## 2024-10-28 DIAGNOSIS — R39.9 LOWER URINARY TRACT SYMPTOMS (LUTS): ICD-10-CM

## 2024-10-28 DIAGNOSIS — C61 MALIGNANT NEOPLASM OF PROSTATE (MULTI): Primary | ICD-10-CM

## 2024-10-28 PROCEDURE — 99215 OFFICE O/P EST HI 40 MIN: CPT | Performed by: UROLOGY

## 2024-10-28 PROCEDURE — 3079F DIAST BP 80-89 MM HG: CPT | Performed by: UROLOGY

## 2024-10-28 PROCEDURE — 1159F MED LIST DOCD IN RCRD: CPT | Performed by: UROLOGY

## 2024-10-28 PROCEDURE — 1123F ACP DISCUSS/DSCN MKR DOCD: CPT | Performed by: UROLOGY

## 2024-10-28 PROCEDURE — 3077F SYST BP >= 140 MM HG: CPT | Performed by: UROLOGY

## 2024-10-28 PROCEDURE — G2211 COMPLEX E/M VISIT ADD ON: HCPCS | Performed by: UROLOGY

## 2024-10-28 RX ORDER — TAMSULOSIN HYDROCHLORIDE 0.4 MG/1
0.8 CAPSULE ORAL DAILY
Qty: 90 CAPSULE | Refills: 6 | Status: SHIPPED | OUTPATIENT
Start: 2024-10-28 | End: 2025-10-28

## 2024-11-07 ENCOUNTER — HOSPITAL ENCOUNTER (OUTPATIENT)
Dept: RADIOLOGY | Facility: CLINIC | Age: 74
Discharge: HOME | End: 2024-11-07
Payer: MEDICARE

## 2024-11-07 DIAGNOSIS — C61 PROSTATE CANCER (MULTI): ICD-10-CM

## 2024-11-07 PROCEDURE — 78815 PET IMAGE W/CT SKULL-THIGH: CPT | Mod: PI

## 2024-11-25 LAB
GENE ANALYSIS NARR RPT DOC: NEGATIVE
GENE DIS ANL INTERP-IMP: NEGATIVE
Lab: NEGATIVE
TEMPUS PORTAL: NORMAL

## 2024-11-29 DIAGNOSIS — C61 PROSTATE CANCER (MULTI): Primary | ICD-10-CM

## 2024-11-29 DIAGNOSIS — R82.90 ABNORMAL URINALYSIS: ICD-10-CM

## 2024-11-29 DIAGNOSIS — R79.1 ABNORMAL COAGULATION PROFILE: ICD-10-CM

## 2024-11-29 RX ORDER — ACETAMINOPHEN 325 MG/1
975 TABLET ORAL ONCE
OUTPATIENT
Start: 2024-11-29 | End: 2024-11-29

## 2024-12-06 ENCOUNTER — PRE-ADMISSION TESTING (OUTPATIENT)
Dept: PREADMISSION TESTING | Facility: HOSPITAL | Age: 74
End: 2024-12-06
Payer: MEDICARE

## 2024-12-06 ENCOUNTER — LAB (OUTPATIENT)
Dept: LAB | Facility: LAB | Age: 74
End: 2024-12-06
Payer: MEDICARE

## 2024-12-06 VITALS
OXYGEN SATURATION: 97 % | TEMPERATURE: 97.9 F | BODY MASS INDEX: 25.16 KG/M2 | RESPIRATION RATE: 16 BRPM | HEIGHT: 73 IN | SYSTOLIC BLOOD PRESSURE: 133 MMHG | DIASTOLIC BLOOD PRESSURE: 82 MMHG | WEIGHT: 189.82 LBS | HEART RATE: 81 BPM

## 2024-12-06 DIAGNOSIS — R82.90 ABNORMAL URINALYSIS: ICD-10-CM

## 2024-12-06 DIAGNOSIS — C61 PROSTATE CANCER (MULTI): ICD-10-CM

## 2024-12-06 DIAGNOSIS — Z01.818 PREOP TESTING: Primary | ICD-10-CM

## 2024-12-06 DIAGNOSIS — R97.20 ELEVATED PROSTATE SPECIFIC ANTIGEN LESS THAN 10 NG/ML: ICD-10-CM

## 2024-12-06 DIAGNOSIS — R79.1 ABNORMAL COAGULATION PROFILE: ICD-10-CM

## 2024-12-06 LAB
ANION GAP SERPL CALC-SCNC: 10 MMOL/L (ref 10–20)
APTT PPP: 31 SECONDS (ref 27–38)
ATRIAL RATE: 77 BPM
BUN SERPL-MCNC: 13 MG/DL (ref 6–23)
CALCIUM SERPL-MCNC: 8.8 MG/DL (ref 8.6–10.3)
CHLORIDE SERPL-SCNC: 105 MMOL/L (ref 98–107)
CO2 SERPL-SCNC: 28 MMOL/L (ref 21–32)
CREAT SERPL-MCNC: 1.19 MG/DL (ref 0.5–1.3)
EGFRCR SERPLBLD CKD-EPI 2021: 65 ML/MIN/1.73M*2
ERYTHROCYTE [DISTWIDTH] IN BLOOD BY AUTOMATED COUNT: 13.3 % (ref 11.5–14.5)
GLUCOSE SERPL-MCNC: 95 MG/DL (ref 74–99)
HCT VFR BLD AUTO: 43.9 % (ref 41–52)
HGB BLD-MCNC: 14.1 G/DL (ref 13.5–17.5)
INR PPP: 1 (ref 0.9–1.1)
MCH RBC QN AUTO: 27.6 PG (ref 26–34)
MCHC RBC AUTO-ENTMCNC: 32.1 G/DL (ref 32–36)
MCV RBC AUTO: 86 FL (ref 80–100)
NRBC BLD-RTO: 0 /100 WBCS (ref 0–0)
P AXIS: 78 DEGREES
P OFFSET: 170 MS
P ONSET: 119 MS
PLATELET # BLD AUTO: 248 X10*3/UL (ref 150–450)
POTASSIUM SERPL-SCNC: 4.2 MMOL/L (ref 3.5–5.3)
PR INTERVAL: 206 MS
PROTHROMBIN TIME: 11.7 SECONDS (ref 9.8–12.8)
Q ONSET: 222 MS
QRS COUNT: 12 BEATS
QRS DURATION: 126 MS
QT INTERVAL: 400 MS
QTC CALCULATION(BAZETT): 452 MS
QTC FREDERICIA: 434 MS
R AXIS: 41 DEGREES
RBC # BLD AUTO: 5.11 X10*6/UL (ref 4.5–5.9)
SODIUM SERPL-SCNC: 139 MMOL/L (ref 136–145)
T AXIS: 32 DEGREES
T OFFSET: 422 MS
VENTRICULAR RATE: 77 BPM
WBC # BLD AUTO: 3.5 X10*3/UL (ref 4.4–11.3)

## 2024-12-06 PROCEDURE — 36415 COLL VENOUS BLD VENIPUNCTURE: CPT

## 2024-12-06 PROCEDURE — 87086 URINE CULTURE/COLONY COUNT: CPT

## 2024-12-06 PROCEDURE — 84154 ASSAY OF PSA FREE: CPT

## 2024-12-06 PROCEDURE — 93010 ELECTROCARDIOGRAM REPORT: CPT | Performed by: INTERNAL MEDICINE

## 2024-12-06 PROCEDURE — 85730 THROMBOPLASTIN TIME PARTIAL: CPT

## 2024-12-06 PROCEDURE — 85027 COMPLETE CBC AUTOMATED: CPT

## 2024-12-06 PROCEDURE — 85610 PROTHROMBIN TIME: CPT

## 2024-12-06 PROCEDURE — 93005 ELECTROCARDIOGRAM TRACING: CPT

## 2024-12-06 PROCEDURE — 80048 BASIC METABOLIC PNL TOTAL CA: CPT

## 2024-12-06 PROCEDURE — 84153 ASSAY OF PSA TOTAL: CPT

## 2024-12-06 RX ORDER — TERAZOSIN 2 MG/1
2 CAPSULE ORAL NIGHTLY
COMMUNITY
End: 2024-12-12 | Stop reason: HOSPADM

## 2024-12-06 ASSESSMENT — DUKE ACTIVITY SCORE INDEX (DASI)
CAN YOU CLIMB A FLIGHT OF STAIRS OR WALK UP A HILL: YES
CAN YOU PARTICIPATE IN MODERATE RECREATIONAL ACTIVITIES LIKE GOLF, BOWLING, DANCING, DOUBLES TENNIS OR THROWING A BASEBALL OR FOOTBALL: NO
DASI METS SCORE: 7.6
CAN YOU DO MODERATE WORK AROUND THE HOUSE LIKE VACUUMING, SWEEPING FLOORS OR CARRYING GROCERIES: YES
CAN YOU HAVE SEXUAL RELATIONS: NO
CAN YOU DO LIGHT WORK AROUND THE HOUSE LIKE DUSTING OR WASHING DISHES: YES
CAN YOU WALK A BLOCK OR TWO ON LEVEL GROUND: YES
CAN YOU DO YARD WORK LIKE RAKING LEAVES, WEEDING OR PUSHING A MOWER: YES
CAN YOU PARTICIPATE IN STRENOUS SPORTS LIKE SWIMMING, SINGLES TENNIS, FOOTBALL, BASKETBALL, OR SKIING: NO
CAN YOU TAKE CARE OF YOURSELF (EAT, DRESS, BATHE, OR USE TOILET): YES
CAN YOU WALK INDOORS, SUCH AS AROUND YOUR HOUSE: YES
CAN YOU RUN A SHORT DISTANCE: YES
CAN YOU DO HEAVY WORK AROUND THE HOUSE LIKE SCRUBBING FLOORS OR LIFTING AND MOVING HEAVY FURNITURE: YES
TOTAL_SCORE: 39.45

## 2024-12-06 ASSESSMENT — ACTIVITIES OF DAILY LIVING (ADL): ADL_SCORE: 0

## 2024-12-06 ASSESSMENT — LIFESTYLE VARIABLES: SMOKING_STATUS: NONSMOKER

## 2024-12-06 NOTE — CPM/PAT H&P
CPM/PAT Evaluation       Name: Jatinder Estrada (Jatinder Estrada)  /Age: 1950/74 y.o.     In-Person       Chief Complaint: Prostate Cancer    HPI74 year old male for Robotic Radical Prostatectomy 24. Patient states he's been having difficulty fully emptying bladder and urinary incontinence along with rise in PSA levels. He was diagnosed with prostate cancer in in October.       Past Medical History:   Diagnosis Date    Acute nasopharyngitis (common cold) 2021    Nasopharyngitis    Benign prostatic hyperplasia     Cancer (Multi)     Contact with and (suspected) exposure to covid-19 2021    Suspected COVID-19 virus infection    Elevated PSA     Encounter for immunization 2021    Encounter for immunization    History of continuous positive airway pressure (CPAP) therapy at home     Hypertension     Nasal congestion 2021    Nasal congestion with rhinorrhea    Refusal of blood product     Sleep apnea     Urinary incontinence        Past Surgical History:   Procedure Laterality Date    COLONOSCOPY      OTHER SURGICAL HISTORY  2019    Phacoemulsification of cataract and insertion of intraocular lens    OTHER SURGICAL HISTORY  2019    Hemorrhoidectomy    OTHER SURGICAL HISTORY  2019    Colonoscopy    PROSTATE SURGERY  2024       Patient  has no history on file for sexual activity.    Family History   Problem Relation Name Age of Onset    Diabetes Mother Sharonda Estrada     Hypertension Mother Sharonda Estrada     Heart disease Father [place]     Hypertension Father [place]     Diabetes Sister x2     No Known Problems Brother x1     Diabetes Brother [id number]        No Known Allergies    Prior to Admission medications    Medication Sig Start Date End Date Taking? Authorizing Provider   amLODIPine (Norvasc) 10 mg tablet Take 1 tablet (10 mg) by mouth once daily. 24   Buddy Salvador,    cholecalciferol (Vitamin D-3) 50 MCG (2000 UT) tablet Take 1 tablet (2,000 Units)  by mouth once daily.    Historical Provider, MD   diaper,brief,adult,disposable (Depend Easy Fit Undergarments) misc 3 times daily as needed 4/16/24   Buddy Salvador DO   dutasteride (Avodart) 0.5 mg capsule TAKE 1 CAPSULE BY MOUTH ONCE  DAILY 7/8/24   Buddy Salvador DO   tamsulosin (Flomax) 0.4 mg 24 hr capsule Take 2 capsules (0.8 mg) by mouth once daily.  Patient not taking: Reported on 12/6/2024 10/28/24 10/28/25  Benito Bowman MD   terazosin (Hytrin) 2 mg capsule Take 1 capsule (2 mg) by mouth once daily at bedtime.    Historical Provider, MD   valsartan (Diovan) 160 mg tablet Take 1 tablet (160 mg) by mouth once daily. 5/16/24 5/11/25  Buddy Salvador DO     Refer to updated medication list on file       Constitutional: Negative for fever, chills, or sweats   ENMT: Negative for nasal discharge, congestion, ear pain, mouth pain, throat pain   Respiratory: Negative for cough, wheezing, shortness of breath   Cardiac: Negative for chest pain, dyspnea on exertion, palpitations   Gastrointestinal: Negative for nausea, vomiting, diarrhea, constipation, abdominal pain  Genitourinary: Negative for dysuria, flank pain, frequency, hematuria Positive for difficulty fully emptying bladder and urinary incontinence   Musculoskeletal: Negative for decreased ROM, pain, swelling, weakness   Neurological: Negative for dizziness, confusion, headache  Psychiatric: Negative for mood changes   Skin: Negative for itching, rash, ulcer    Hematologic/Lymph: Negative for bruising, easy bleeding  Allergic/Immunologic: Negative itching, sneezing, swelling       Physical Exam  Constitutional:       Appearance: Normal appearance.   HENT:      Head: Normocephalic.   Eyes:      Extraocular Movements: Extraocular movements intact.   Cardiovascular:      Rate and Rhythm: Normal rate and regular rhythm.      Heart sounds: Normal heart sounds.   Pulmonary:      Effort: Pulmonary effort is normal.      Breath sounds: Normal breath  sounds.   Abdominal:      General: Bowel sounds are normal.      Palpations: Abdomen is soft.   Musculoskeletal:         General: Normal range of motion.      Cervical back: Normal range of motion.   Skin:     General: Skin is warm and dry.   Neurological:      Mental Status: He is alert and oriented to person, place, and time.   Psychiatric:         Mood and Affect: Mood normal.          PAT AIRWAY:   Airway:     Neck ROM::  Full   Partial lower dentures   partials    Patient Specialist/PCP: [place]    Anesthesia:  Patient denies any anesthesia complications.      Visit Vitals  /82   Pulse 81   Temp 36.6 °C (97.9 °F) (Temporal)   Resp 16       DASI Risk Score      Flowsheet Row Pre-Admission Testing from 12/6/2024 in [facility] Questionnaire Series Submission from 11/30/2024 in [facility] with Generic Provider [place]   Can you take care of yourself (eat, dress, bathe, or use toilet)?  2.75 filed at 12/06/2024 1055 2.75  filed at 11/30/2024 0851   Can you walk indoors, such as around your house? 1.75 filed at 12/06/2024 1055 1.75  filed at 11/30/2024 0851   Can you walk a block or two on level ground?  2.75 filed at 12/06/2024 1055 2.75  filed at 11/30/2024 0851   Can you climb a flight of stairs or walk up a hill? 5.5 filed at 12/06/2024 1055 5.5  filed at 11/30/2024 0851   Can you run a short distance? 8 filed at 12/06/2024 1055 8  filed at 11/30/2024 0851   Can you do light work around the house like dusting or washing dishes? 2.7 filed at 12/06/2024 1055 2.7  filed at 11/30/2024 0851   Can you do moderate work around the house like vacuuming, sweeping floors or carrying groceries? 3.5 filed at 12/06/2024 1055 3.5  filed at 11/30/2024 0851   Can you do heavy work around the house like scrubbing floors or lifting and moving heavy furniture?  8 filed at 12/06/2024 1055 8  filed at 11/30/2024 0851   Can you do yard work like raking leaves, weeding or pushing a mower? 4.5 filed at 12/06/2024  1055 4.5  filed at 11/30/2024 0851   Can you have sexual relations? 0 filed at 12/06/2024 1055 5.25  filed at 11/30/2024 0851   Can you participate in moderate recreational activities like golf, bowling, dancing, doubles tennis or throwing a baseball or football? 0 filed at 12/06/2024 1055 6  filed at 11/30/2024 0851   Can you participate in strenous sports like swimming, singles tennis, football, basketball, or skiing? 0 filed at 12/06/2024 1055 7.5  filed at 11/30/2024 0851   DASI SCORE 39.45 filed at 12/06/2024 1055 58.2  filed at 11/30/2024 0851   METS Score (Will be calculated only when all the questions are answered) 7.6 filed at 12/06/2024 1055 9.9  filed at 11/30/2024 0851          Caprini DVT Assessment      Flowsheet Row Pre-Admission Testing from 12/6/2024 in [facility]   DVT Score 8 filed at 12/06/2024 1053   Surgical Factors Major surgery planned, lasting over 3 hours filed at 12/06/2024 1053   BMI 30 or less filed at 12/06/2024 1053          Modified Frailty Index      Flowsheet Row Pre-Admission Testing from 12/6/2024 in [facility]   Non-independent functional status (problems with dressing, bathing, personal grooming, or cooking) 0 filed at 12/06/2024 1056   History of diabetes mellitus  0 filed at 12/06/2024 1056   History of COPD 0 filed at 12/06/2024 1056   History of CHF No filed at 12/06/2024 1056   History of MI 0 filed at 12/06/2024 1056   History of Percutaneous Coronary Intervention, Cardiac Surgery, or Angina No filed at 12/06/2024 1056   Hypertension requiring the use of medication  0.0909 filed at 12/06/2024 1056   Peripheral vascular disease 0 filed at 12/06/2024 1056   Impaired sensorium (cognitive impairement or loss, clouding, or delirium) 0 filed at 12/06/2024 1056   TIA or CVA withouy residual deficit 0 filed at 12/06/2024 1056   Cerebrovascular accident with deficit 0 filed at 12/06/2024 1056   Modified Frailty Index Calculator .0909 filed at  2024 1056          CHADS2 Stroke Risk  Current as of a minute ago        N/A 3 to 100%: High Risk   2 to < 3%: Medium Risk   0 to < 2%: Low Risk     Last Change: N/A          This score determines the patient's risk of having a stroke if the patient has atrial fibrillation.        This score is not applicable to this patient. Components are not calculated.          Revised Cardiac Risk Index    No data to display       Apfel Simplified Score      Flowsheet Row Pre-Admission Testing from 2024 in [facility]   Smoking status 1 filed at 2024 1056   History of motion sickness or PONV  0 filed at 2024 1056   Use of postoperative opioids 1 filed at 2024 1056   Gender - Female 0=No filed at 2024 1056   Apfel Simplified Score Calculator 2 filed at 2024 1056          Risk Analysis Index Results This Encounter         2024  1057             Do you live in a place other than your own home?: 0    When did you begin living in the place you are currently residing?: Greater than one year ago    Any kidney failure, kidney not working well, or seeing a kidney doctor (nephrologist)? If yes, was this for kidney stones or another problem?: 8 Other (Comment)    Any history of chronic (long-term) congestive heart failure (CHF)?: 0 No    Any shortness of breath when resting?: 0 No    In the past five years, have you been diagnosed with or treated for cancer?: Yes    During the last 3 months has it become difficult for you to remember things or organize your thoughts?: 0 No    Have you lost weight of 10 pounds or more in the past 3 months without trying?: 0 No    Do you have any loss of appetitie?: 0 No    Getting Around (Mobility): 0 Can get around without help    Eatin Can plan and prepare own meals    Toiletin Can use toilet without any help    Personal Hygiene (Bathing, Hand Washing, Changing Clothes): 0 Can shower or bathe without any help    ESPINOSA Cancer History:  Patient indicates history of cancer    Total Risk Analysis Index Score Without Cancer: 33    Total Risk Analysis Index Score: 45          Stop Bang Score      Flowsheet Row Pre-Admission Testing from 12/6/2024 in [facility]   Do you snore loudly? 0 filed at 12/06/2024 1054   Do you often feel tired or fatigued after your sleep? 0 filed at 12/06/2024 1054   Has anyone ever observed you stop breathing in your sleep? 1 filed at 12/06/2024 1054   Do you have or are you being treated for high blood pressure? 1 filed at 12/06/2024 1054   Recent BMI (Calculated) 25.1 filed at 12/06/2024 1054   Is BMI greater than 35 kg/m2? 0=No filed at 12/06/2024 1054   Age older than 50 years old? 1=Yes filed at 12/06/2024 1054   Is your neck circumference greater than 17 inches (Male) or 16 inches (Female)? 0 filed at 12/06/2024 1054   Gender - Male 1=Yes filed at 12/06/2024 1054   STOP-BANG Total Score 4 filed at 12/06/2024 1054          Prodigy: High Risk  Total Score: 20              Prodigy Age Score      Prodigy Gender Score          ARISCAT Score for Postoperative Pulmonary Complications      Flowsheet Row Pre-Admission Testing from 12/6/2024 in [facility]   Age, years  5 filed at 12/06/2024 1057   Preoperative SpO2 0 filed at 12/06/2024 1057   Respiratory infection in the last month Either upper or lower (i.e., URI, bronchitis, pneumonia), with fever and antibiotic treatment 0 filed at 12/06/2024 1057   Preoperative anemoa (Hgb less than 10 g/dl) 0 filed at 12/06/2024 1057   Surgical incision  0 filed at 12/06/2024 1057   Duration of surgery  23 filed at 12/06/2024 1057   Emergency Procedure  0 filed at 12/06/2024 1057   ARISCAT Total Score  26 filed at 12/06/2024 1057          David Perioperative Risk for Myocardial Infarction or Cardiac Arrest (JASSI)      Flowsheet Row Pre-Admission Testing from 12/6/2024 in [facility]   Age 1.46 filed at 12/06/2024 1058   Functional Status  0  filed at 12/06/2024 1058   ASA Class  -3.29 filed at 12/06/2024 1058   Creatinine 0 filed at 12/06/2024 1058   Type of Procedure  -0.26 filed at 12/06/2024 1058   JASSI Total Score  -7.36 filed at 12/06/2024 1058   JASSI % 0.06 filed at 12/06/2024 1058          Assessment and Plan:     Preop:   OR with Dr Bowman.   Labs today as ordered per [place].   EKG obtained and enclosed. Discussed EKG with patient and he verbalized that he will discuss with his PCP Dr. Salvador at his next appointment.     Neurologic:   Preoperative brain exercise educational handout provided to patient.    Cardiac:  DASI Score: 39.45   MET Score: 7.6   RCRI  2 which is 10.1% 30 day risk of MACE (risk for cardiac death, nonfatal myocardial infarction, and nonfactal cardiac arrest)  JASSI score which indicates a  0.06% risk of intraoperative or 30-day postoperative MACE  Hypertension: on medication, /82 today    Pulmonary:   STOP-BANG score of  4. Intermediate risk of obstructive sleep apnea.   ARISCAT: 26 points which is a intermediate (13.3%) risk of in-hospital post-op pulmonary complications   Sleep Apnea: on CPAP at night    GI:  Apfel: 2 points 39% risk for post operative N/V    /Renal:   Above    Hematologic:   Caprini score 8, patient at high risk for perioperative DVT. Patient provided with VTE education/handout.     Anesthesia: No history of anesthesia complications. No anesthesia concerns.      *See risk scores as previously documented

## 2024-12-06 NOTE — H&P (VIEW-ONLY)
CPM/PAT Evaluation       Name: Jatinder Estrada (Jatinder Estrada)  /Age: 1950/74 y.o.     In-Person       Chief Complaint: Prostate Cancer    HPI74 year old male for Robotic Radical Prostatectomy 24. Patient states he's been having difficulty fully emptying bladder and urinary incontinence along with rise in PSA levels. He was diagnosed with prostate cancer in in October.       Past Medical History:   Diagnosis Date    Acute nasopharyngitis (common cold) 2021    Nasopharyngitis    Benign prostatic hyperplasia     Cancer (Multi)     Contact with and (suspected) exposure to covid-19 2021    Suspected COVID-19 virus infection    Elevated PSA     Encounter for immunization 2021    Encounter for immunization    History of continuous positive airway pressure (CPAP) therapy at home     Hypertension     Nasal congestion 2021    Nasal congestion with rhinorrhea    Refusal of blood product     Sleep apnea     Urinary incontinence        Past Surgical History:   Procedure Laterality Date    COLONOSCOPY      OTHER SURGICAL HISTORY  2019    Phacoemulsification of cataract and insertion of intraocular lens    OTHER SURGICAL HISTORY  2019    Hemorrhoidectomy    OTHER SURGICAL HISTORY  2019    Colonoscopy    PROSTATE SURGERY  2024       Patient  has no history on file for sexual activity.    Family History   Problem Relation Name Age of Onset    Diabetes Mother Sharonda Estrada     Hypertension Mother Sharonda Estrada     Heart disease Father [place]     Hypertension Father [place]     Diabetes Sister x2     No Known Problems Brother x1     Diabetes Brother [id number]        No Known Allergies    Prior to Admission medications    Medication Sig Start Date End Date Taking? Authorizing Provider   amLODIPine (Norvasc) 10 mg tablet Take 1 tablet (10 mg) by mouth once daily. 24   Buddy Salvador,    cholecalciferol (Vitamin D-3) 50 MCG (2000 UT) tablet Take 1 tablet (2,000 Units)  by mouth once daily.    Historical Provider, MD   diaper,brief,adult,disposable (Depend Easy Fit Undergarments) misc 3 times daily as needed 4/16/24   Buddy Salvador DO   dutasteride (Avodart) 0.5 mg capsule TAKE 1 CAPSULE BY MOUTH ONCE  DAILY 7/8/24   Buddy Salvador DO   tamsulosin (Flomax) 0.4 mg 24 hr capsule Take 2 capsules (0.8 mg) by mouth once daily.  Patient not taking: Reported on 12/6/2024 10/28/24 10/28/25  Benito Bowman MD   terazosin (Hytrin) 2 mg capsule Take 1 capsule (2 mg) by mouth once daily at bedtime.    Historical Provider, MD   valsartan (Diovan) 160 mg tablet Take 1 tablet (160 mg) by mouth once daily. 5/16/24 5/11/25  Buddy Salvador DO     Refer to updated medication list on file       Constitutional: Negative for fever, chills, or sweats   ENMT: Negative for nasal discharge, congestion, ear pain, mouth pain, throat pain   Respiratory: Negative for cough, wheezing, shortness of breath   Cardiac: Negative for chest pain, dyspnea on exertion, palpitations   Gastrointestinal: Negative for nausea, vomiting, diarrhea, constipation, abdominal pain  Genitourinary: Negative for dysuria, flank pain, frequency, hematuria Positive for difficulty fully emptying bladder and urinary incontinence   Musculoskeletal: Negative for decreased ROM, pain, swelling, weakness   Neurological: Negative for dizziness, confusion, headache  Psychiatric: Negative for mood changes   Skin: Negative for itching, rash, ulcer    Hematologic/Lymph: Negative for bruising, easy bleeding  Allergic/Immunologic: Negative itching, sneezing, swelling       Physical Exam  Constitutional:       Appearance: Normal appearance.   HENT:      Head: Normocephalic.   Eyes:      Extraocular Movements: Extraocular movements intact.   Cardiovascular:      Rate and Rhythm: Normal rate and regular rhythm.      Heart sounds: Normal heart sounds.   Pulmonary:      Effort: Pulmonary effort is normal.      Breath sounds: Normal breath  sounds.   Abdominal:      General: Bowel sounds are normal.      Palpations: Abdomen is soft.   Musculoskeletal:         General: Normal range of motion.      Cervical back: Normal range of motion.   Skin:     General: Skin is warm and dry.   Neurological:      Mental Status: He is alert and oriented to person, place, and time.   Psychiatric:         Mood and Affect: Mood normal.          PAT AIRWAY:   Airway:     Neck ROM::  Full   Partial lower dentures   partials    Patient Specialist/PCP: [place]    Anesthesia:  Patient denies any anesthesia complications.      Visit Vitals  /82   Pulse 81   Temp 36.6 °C (97.9 °F) (Temporal)   Resp 16       DASI Risk Score      Flowsheet Row Pre-Admission Testing from 12/6/2024 in [facility] Questionnaire Series Submission from 11/30/2024 in [facility] with Generic Provider [place]   Can you take care of yourself (eat, dress, bathe, or use toilet)?  2.75 filed at 12/06/2024 1055 2.75  filed at 11/30/2024 0851   Can you walk indoors, such as around your house? 1.75 filed at 12/06/2024 1055 1.75  filed at 11/30/2024 0851   Can you walk a block or two on level ground?  2.75 filed at 12/06/2024 1055 2.75  filed at 11/30/2024 0851   Can you climb a flight of stairs or walk up a hill? 5.5 filed at 12/06/2024 1055 5.5  filed at 11/30/2024 0851   Can you run a short distance? 8 filed at 12/06/2024 1055 8  filed at 11/30/2024 0851   Can you do light work around the house like dusting or washing dishes? 2.7 filed at 12/06/2024 1055 2.7  filed at 11/30/2024 0851   Can you do moderate work around the house like vacuuming, sweeping floors or carrying groceries? 3.5 filed at 12/06/2024 1055 3.5  filed at 11/30/2024 0851   Can you do heavy work around the house like scrubbing floors or lifting and moving heavy furniture?  8 filed at 12/06/2024 1055 8  filed at 11/30/2024 0851   Can you do yard work like raking leaves, weeding or pushing a mower? 4.5 filed at 12/06/2024  1055 4.5  filed at 11/30/2024 0851   Can you have sexual relations? 0 filed at 12/06/2024 1055 5.25  filed at 11/30/2024 0851   Can you participate in moderate recreational activities like golf, bowling, dancing, doubles tennis or throwing a baseball or football? 0 filed at 12/06/2024 1055 6  filed at 11/30/2024 0851   Can you participate in strenous sports like swimming, singles tennis, football, basketball, or skiing? 0 filed at 12/06/2024 1055 7.5  filed at 11/30/2024 0851   DASI SCORE 39.45 filed at 12/06/2024 1055 58.2  filed at 11/30/2024 0851   METS Score (Will be calculated only when all the questions are answered) 7.6 filed at 12/06/2024 1055 9.9  filed at 11/30/2024 0851          Caprini DVT Assessment      Flowsheet Row Pre-Admission Testing from 12/6/2024 in [facility]   DVT Score 8 filed at 12/06/2024 1053   Surgical Factors Major surgery planned, lasting over 3 hours filed at 12/06/2024 1053   BMI 30 or less filed at 12/06/2024 1053          Modified Frailty Index      Flowsheet Row Pre-Admission Testing from 12/6/2024 in [facility]   Non-independent functional status (problems with dressing, bathing, personal grooming, or cooking) 0 filed at 12/06/2024 1056   History of diabetes mellitus  0 filed at 12/06/2024 1056   History of COPD 0 filed at 12/06/2024 1056   History of CHF No filed at 12/06/2024 1056   History of MI 0 filed at 12/06/2024 1056   History of Percutaneous Coronary Intervention, Cardiac Surgery, or Angina No filed at 12/06/2024 1056   Hypertension requiring the use of medication  0.0909 filed at 12/06/2024 1056   Peripheral vascular disease 0 filed at 12/06/2024 1056   Impaired sensorium (cognitive impairement or loss, clouding, or delirium) 0 filed at 12/06/2024 1056   TIA or CVA withouy residual deficit 0 filed at 12/06/2024 1056   Cerebrovascular accident with deficit 0 filed at 12/06/2024 1056   Modified Frailty Index Calculator .0909 filed at  2024 1056          CHADS2 Stroke Risk  Current as of a minute ago        N/A 3 to 100%: High Risk   2 to < 3%: Medium Risk   0 to < 2%: Low Risk     Last Change: N/A          This score determines the patient's risk of having a stroke if the patient has atrial fibrillation.        This score is not applicable to this patient. Components are not calculated.          Revised Cardiac Risk Index    No data to display       Apfel Simplified Score      Flowsheet Row Pre-Admission Testing from 2024 in [facility]   Smoking status 1 filed at 2024 1056   History of motion sickness or PONV  0 filed at 2024 1056   Use of postoperative opioids 1 filed at 2024 1056   Gender - Female 0=No filed at 2024 1056   Apfel Simplified Score Calculator 2 filed at 2024 1056          Risk Analysis Index Results This Encounter         2024  1057             Do you live in a place other than your own home?: 0    When did you begin living in the place you are currently residing?: Greater than one year ago    Any kidney failure, kidney not working well, or seeing a kidney doctor (nephrologist)? If yes, was this for kidney stones or another problem?: 8 Other (Comment)    Any history of chronic (long-term) congestive heart failure (CHF)?: 0 No    Any shortness of breath when resting?: 0 No    In the past five years, have you been diagnosed with or treated for cancer?: Yes    During the last 3 months has it become difficult for you to remember things or organize your thoughts?: 0 No    Have you lost weight of 10 pounds or more in the past 3 months without trying?: 0 No    Do you have any loss of appetitie?: 0 No    Getting Around (Mobility): 0 Can get around without help    Eatin Can plan and prepare own meals    Toiletin Can use toilet without any help    Personal Hygiene (Bathing, Hand Washing, Changing Clothes): 0 Can shower or bathe without any help    ESPINOSA Cancer History:  Patient indicates history of cancer    Total Risk Analysis Index Score Without Cancer: 33    Total Risk Analysis Index Score: 45          Stop Bang Score      Flowsheet Row Pre-Admission Testing from 12/6/2024 in [facility]   Do you snore loudly? 0 filed at 12/06/2024 1054   Do you often feel tired or fatigued after your sleep? 0 filed at 12/06/2024 1054   Has anyone ever observed you stop breathing in your sleep? 1 filed at 12/06/2024 1054   Do you have or are you being treated for high blood pressure? 1 filed at 12/06/2024 1054   Recent BMI (Calculated) 25.1 filed at 12/06/2024 1054   Is BMI greater than 35 kg/m2? 0=No filed at 12/06/2024 1054   Age older than 50 years old? 1=Yes filed at 12/06/2024 1054   Is your neck circumference greater than 17 inches (Male) or 16 inches (Female)? 0 filed at 12/06/2024 1054   Gender - Male 1=Yes filed at 12/06/2024 1054   STOP-BANG Total Score 4 filed at 12/06/2024 1054          Prodigy: High Risk  Total Score: 20              Prodigy Age Score      Prodigy Gender Score          ARISCAT Score for Postoperative Pulmonary Complications      Flowsheet Row Pre-Admission Testing from 12/6/2024 in [facility]   Age, years  5 filed at 12/06/2024 1057   Preoperative SpO2 0 filed at 12/06/2024 1057   Respiratory infection in the last month Either upper or lower (i.e., URI, bronchitis, pneumonia), with fever and antibiotic treatment 0 filed at 12/06/2024 1057   Preoperative anemoa (Hgb less than 10 g/dl) 0 filed at 12/06/2024 1057   Surgical incision  0 filed at 12/06/2024 1057   Duration of surgery  23 filed at 12/06/2024 1057   Emergency Procedure  0 filed at 12/06/2024 1057   ARISCAT Total Score  26 filed at 12/06/2024 1057          David Perioperative Risk for Myocardial Infarction or Cardiac Arrest (JASSI)      Flowsheet Row Pre-Admission Testing from 12/6/2024 in [facility]   Age 1.46 filed at 12/06/2024 1058   Functional Status  0  filed at 12/06/2024 1058   ASA Class  -3.29 filed at 12/06/2024 1058   Creatinine 0 filed at 12/06/2024 1058   Type of Procedure  -0.26 filed at 12/06/2024 1058   JASSI Total Score  -7.36 filed at 12/06/2024 1058   JASSI % 0.06 filed at 12/06/2024 1058          Assessment and Plan:     Preop:   OR with Dr Bowman.   Labs today as ordered per [place].   EKG obtained and enclosed. Discussed EKG with patient and he verbalized that he will discuss with his PCP Dr. Salvador at his next appointment.     Neurologic:   Preoperative brain exercise educational handout provided to patient.    Cardiac:  DASI Score: 39.45   MET Score: 7.6   RCRI  2 which is 10.1% 30 day risk of MACE (risk for cardiac death, nonfatal myocardial infarction, and nonfactal cardiac arrest)  JASSI score which indicates a  0.06% risk of intraoperative or 30-day postoperative MACE  Hypertension: on medication, /82 today    Pulmonary:   STOP-BANG score of  4. Intermediate risk of obstructive sleep apnea.   ARISCAT: 26 points which is a intermediate (13.3%) risk of in-hospital post-op pulmonary complications   Sleep Apnea: on CPAP at night    GI:  Apfel: 2 points 39% risk for post operative N/V    /Renal:   Above    Hematologic:   Caprini score 8, patient at high risk for perioperative DVT. Patient provided with VTE education/handout.     Anesthesia: No history of anesthesia complications. No anesthesia concerns.      *See risk scores as previously documented

## 2024-12-06 NOTE — PREPROCEDURE INSTRUCTIONS
Medication List            Accurate as of December 6, 2024 11:03 AM. Always use your most recent med list.                amLODIPine 10 mg tablet  Commonly known as: Norvasc  Take 1 tablet (10 mg) by mouth once daily.  Medication Adjustments for Surgery: Take last dose 1 day (24 hours) before surgery     cholecalciferol 50 MCG (2000 UT) tablet  Commonly known as: Vitamin D-3  Additional Medication Adjustments for Surgery: Take last dose 7 days before surgery     Depend Easy Fit Undergarments misc  Generic drug: diaper,brief,adult,disposable  3 times daily as needed     dutasteride 0.5 mg capsule  Commonly known as: Avodart  TAKE 1 CAPSULE BY MOUTH ONCE  DAILY  Medication Adjustments for Surgery: Take/Use as prescribed     tamsulosin 0.4 mg 24 hr capsule  Commonly known as: Flomax  Take 2 capsules (0.8 mg) by mouth once daily.  Medication Adjustments for Surgery: Take/Use as prescribed     terazosin 2 mg capsule  Commonly known as: Hytrin  Medication Adjustments for Surgery: Take/Use as prescribed     valsartan 160 mg tablet  Commonly known as: Diovan  Take 1 tablet (160 mg) by mouth once daily.  Medication Adjustments for Surgery: Take last dose 1 day (24 hours) before surgery                              .    PRE-OPERATIVE INSTRUCTIONS    You will receive notification one business day prior to your procedure to confirm your arrival time. It is important that you answer your phone and/or check your messages during this time. If you do not hear from the surgery center by 5 pm. the day before your procedure, please call [phone].     Please enter the building through the Outpatient entrance and take the elevator off the lobby to the 2nd floor then check in at the Outpatient Surgery desk on the 2nd floor.    INSTRUCTIONS:  Talk to your surgeon for instructions if you should stop your aspirin, blood thinner, or diabetes medicines.  DO NOT take any multivitamins or over the counter supplements for 7-10 days before  surgery.  If not being admitted, you must have an adult immediately available to drive you home after surgery. We also highly recommend you have someone stay with you for the entire day and night of your surgery.  For children having surgery, a parent or legal guardian must accompany them to the surgery center. If this is not possible, please call [phone] to make additional arrangements.  For adults who are unable to consent or make medical decisions for themselves, a legal guardian or Power of  must accompany them to the surgery center. If this is not possible, please call [phone] to make additional arrangements.  Wear comfortable, loose fitting clothing.  All jewelry and piercings must be removed. If you are unable to remove an item or have a dermal piercing, please be sure to tell the nurse when you arrive for surgery.  Nail polish and make-up must be removed.  Avoid smoking or consuming alcohol for 24 hours before surgery.  To help prevent infection, please take a shower/bath and wash your hair the night before and/or morning of surgery (or follow other specific bathing instructions provided).    Preoperative Fasting Guidelines    Why must I stop eating and drinking near surgery time?  With sedation, food or liquid in your stomach can enter your lungs causing serious complications  Increases nausea and vomiting    When do I need to stop eating and drinking before my surgery?  Do not eat any solid food after midnight the night before your surgery/procedure unless otherwise instructed by your surgeon.   You may have up to 13.5 ounces of clear liquid until TWO hours before your instructed arrival time to the hospital.  This includes water, black tea/coffee, (no milk or cream) apple juice, and electrolyte drinks (Gatorade).   You may chew gum until TWO hours before your surgery/procedure      If applicable, notify your surgeons office immediately of any new skin changes that occur to the surgical  limb.          If you have any questions or concerns, please call Pre-Admission Testing at [phone].                ..Home Preoperative Antibacterial Shower with Chlorhexidine gluconate (CHG)     What is a home preoperative antibacterial shower?  This shower is a way of cleaning the skin with a germ killing solution before surgery. The solution contains chlorhexidine gluconate, commonly known as CHG. CHG is a skin cleanser with germ killing ability. Let your doctor know if you are allergic to chlorhexidine.    Why do I need to take a preoperative antibacterial shower?  Skin is not sterile. It is best to try to make your skin as free of germs as possible before surgery. Proper cleansing with a germ killing soap before surgery can lower the number of germs on your skin. This helps to reduce the risk of infection at the surgical site. Following the instructions listed below will help you prepare your skin for surgery.    How do I use the solution?  Begin using your CHG soap the night before and again the morning of your procedure.   Do not shave the day before or day of surgery.  Remove all jewelry until after surgery. Take off rings and take out all body-piercing jewelry.  Wash your face and hair with normal soap and shampoo before you use the CHG soap.  Apply the CHG solution to a clean wet washcloth. Move away from the water to avoid premature rinsing of the CHG soap as you are applying. Firmly lather your entire body from the neck down. Do not use CHG on your face, eyes, ears, or genitals.   Pay special attention to the area where your incisions will be located.  Do not scrub your skin too hard.  It is important to allow the CHG soap to sit on your skin for 3-5 minutes.  Rinse the solution off your body completely. Do not wash with your normal soap after the CHG soap solution.  Pat yourself dry with a clean, soft towel.  Do not apply powders, lotions or deodorants as these might block how the CHG soap works.    Dress in clean clothing.  Be sure to sleep with clean, freshly laundered sheets.  Be aware that CHG can cause stains on fabric. Rinse your washcloth and other linens that have contact with CHG completely. Use only non-chlorine detergents to launder the items used.

## 2024-12-07 LAB — BACTERIA UR CULT: NORMAL

## 2024-12-10 LAB
PSA FREE MFR SERPL: 13 %
PSA FREE SERPL-MCNC: 1.4 NG/ML
PSA SERPL IA-MCNC: 10.6 NG/ML (ref 0–4)

## 2024-12-11 ENCOUNTER — ANESTHESIA (OUTPATIENT)
Dept: OPERATING ROOM | Facility: HOSPITAL | Age: 74
End: 2024-12-11
Payer: MEDICARE

## 2024-12-11 ENCOUNTER — ANESTHESIA EVENT (OUTPATIENT)
Dept: OPERATING ROOM | Facility: HOSPITAL | Age: 74
End: 2024-12-11
Payer: MEDICARE

## 2024-12-11 ENCOUNTER — HOSPITAL ENCOUNTER (OUTPATIENT)
Facility: HOSPITAL | Age: 74
Discharge: HOME | End: 2024-12-12
Attending: UROLOGY | Admitting: UROLOGY
Payer: MEDICARE

## 2024-12-11 DIAGNOSIS — C61 PROSTATE CANCER (MULTI): Primary | ICD-10-CM

## 2024-12-11 DIAGNOSIS — G89.18 ACUTE POST-OPERATIVE PAIN: ICD-10-CM

## 2024-12-11 PROCEDURE — 2720000007 HC OR 272 NO HCPCS: Performed by: UROLOGY

## 2024-12-11 PROCEDURE — 2500000004 HC RX 250 GENERAL PHARMACY W/ HCPCS (ALT 636 FOR OP/ED): Mod: JZ | Performed by: UROLOGY

## 2024-12-11 PROCEDURE — 2500000004 HC RX 250 GENERAL PHARMACY W/ HCPCS (ALT 636 FOR OP/ED)

## 2024-12-11 PROCEDURE — 55866 LAPS SURG PRST8ECT RPBIC RAD: CPT | Performed by: UROLOGY

## 2024-12-11 PROCEDURE — 38571 LAPAROSCOPY LYMPHADENECTOMY: CPT | Performed by: UROLOGY

## 2024-12-11 PROCEDURE — 3700000001 HC GENERAL ANESTHESIA TIME - INITIAL BASE CHARGE: Performed by: UROLOGY

## 2024-12-11 PROCEDURE — 3600000018 HC OR TIME - INITIAL BASE CHARGE - PROCEDURE LEVEL SIX: Performed by: UROLOGY

## 2024-12-11 PROCEDURE — 2500000001 HC RX 250 WO HCPCS SELF ADMINISTERED DRUGS (ALT 637 FOR MEDICARE OP): Performed by: UROLOGY

## 2024-12-11 PROCEDURE — 96372 THER/PROPH/DIAG INJ SC/IM: CPT

## 2024-12-11 PROCEDURE — C1889 IMPLANT/INSERT DEVICE, NOC: HCPCS | Performed by: UROLOGY

## 2024-12-11 PROCEDURE — 7100000002 HC RECOVERY ROOM TIME - EACH INCREMENTAL 1 MINUTE: Performed by: UROLOGY

## 2024-12-11 PROCEDURE — 7100000001 HC RECOVERY ROOM TIME - INITIAL BASE CHARGE: Performed by: UROLOGY

## 2024-12-11 PROCEDURE — 2500000005 HC RX 250 GENERAL PHARMACY W/O HCPCS: Performed by: ANESTHESIOLOGIST ASSISTANT

## 2024-12-11 PROCEDURE — 3700000002 HC GENERAL ANESTHESIA TIME - EACH INCREMENTAL 1 MINUTE: Performed by: UROLOGY

## 2024-12-11 PROCEDURE — C1788 PORT, INDWELLING, IMP: HCPCS | Performed by: UROLOGY

## 2024-12-11 PROCEDURE — P9045 ALBUMIN (HUMAN), 5%, 250 ML: HCPCS | Mod: JZ | Performed by: ANESTHESIOLOGY

## 2024-12-11 PROCEDURE — 2500000005 HC RX 250 GENERAL PHARMACY W/O HCPCS: Performed by: ANESTHESIOLOGY

## 2024-12-11 PROCEDURE — 2500000004 HC RX 250 GENERAL PHARMACY W/ HCPCS (ALT 636 FOR OP/ED): Mod: JZ | Performed by: ANESTHESIOLOGY

## 2024-12-11 PROCEDURE — A55866 PR LAP,PROSTATECTOMY,RADICAL,W/NERVE SPARE,INCL ROBOTIC: Performed by: ANESTHESIOLOGIST ASSISTANT

## 2024-12-11 PROCEDURE — 2500000001 HC RX 250 WO HCPCS SELF ADMINISTERED DRUGS (ALT 637 FOR MEDICARE OP)

## 2024-12-11 PROCEDURE — A55866 PR LAP,PROSTATECTOMY,RADICAL,W/NERVE SPARE,INCL ROBOTIC: Performed by: ANESTHESIOLOGY

## 2024-12-11 PROCEDURE — 3600000017 HC OR TIME - EACH INCREMENTAL 1 MINUTE - PROCEDURE LEVEL SIX: Performed by: UROLOGY

## 2024-12-11 PROCEDURE — 88309 TISSUE EXAM BY PATHOLOGIST: CPT | Performed by: STUDENT IN AN ORGANIZED HEALTH CARE EDUCATION/TRAINING PROGRAM

## 2024-12-11 PROCEDURE — 99100 ANES PT EXTEME AGE<1 YR&>70: CPT | Performed by: ANESTHESIOLOGY

## 2024-12-11 PROCEDURE — 2500000004 HC RX 250 GENERAL PHARMACY W/ HCPCS (ALT 636 FOR OP/ED): Performed by: ANESTHESIOLOGIST ASSISTANT

## 2024-12-11 PROCEDURE — 2500000004 HC RX 250 GENERAL PHARMACY W/ HCPCS (ALT 636 FOR OP/ED): Performed by: ANESTHESIOLOGY

## 2024-12-11 PROCEDURE — 88307 TISSUE EXAM BY PATHOLOGIST: CPT | Performed by: STUDENT IN AN ORGANIZED HEALTH CARE EDUCATION/TRAINING PROGRAM

## 2024-12-11 PROCEDURE — 7100000011 HC EXTENDED STAY RECOVERY HOURLY - NURSING UNIT

## 2024-12-11 PROCEDURE — 2780000003 HC OR 278 NO HCPCS: Performed by: UROLOGY

## 2024-12-11 PROCEDURE — 88307 TISSUE EXAM BY PATHOLOGIST: CPT | Mod: TC,STJLAB | Performed by: UROLOGY

## 2024-12-11 RX ORDER — ACETAMINOPHEN 325 MG/1
650 TABLET ORAL EVERY 6 HOURS PRN
Qty: 42 TABLET | Refills: 0 | Status: SHIPPED | OUTPATIENT
Start: 2024-12-11 | End: 2024-12-18

## 2024-12-11 RX ORDER — ALBUMIN HUMAN 50 G/1000ML
25 SOLUTION INTRAVENOUS ONCE
Status: COMPLETED | OUTPATIENT
Start: 2024-12-11 | End: 2024-12-11

## 2024-12-11 RX ORDER — ENOXAPARIN SODIUM 100 MG/ML
40 INJECTION SUBCUTANEOUS DAILY
Qty: 28 EACH | Refills: 0 | Status: SHIPPED | OUTPATIENT
Start: 2024-12-11 | End: 2024-12-12 | Stop reason: HOSPADM

## 2024-12-11 RX ORDER — OXYCODONE AND ACETAMINOPHEN 5; 325 MG/1; MG/1
1 TABLET ORAL EVERY 4 HOURS PRN
Status: DISCONTINUED | OUTPATIENT
Start: 2024-12-11 | End: 2024-12-11 | Stop reason: HOSPADM

## 2024-12-11 RX ORDER — BUPIVACAINE HYDROCHLORIDE 5 MG/ML
INJECTION, SOLUTION EPIDURAL; INTRACAUDAL AS NEEDED
Status: DISCONTINUED | OUTPATIENT
Start: 2024-12-11 | End: 2024-12-11 | Stop reason: HOSPADM

## 2024-12-11 RX ORDER — CEFAZOLIN SODIUM 2 G/100ML
2 INJECTION, SOLUTION INTRAVENOUS ONCE
Status: COMPLETED | OUTPATIENT
Start: 2024-12-11 | End: 2024-12-11

## 2024-12-11 RX ORDER — ENOXAPARIN SODIUM 100 MG/ML
40 INJECTION SUBCUTANEOUS EVERY 24 HOURS
Status: DISCONTINUED | OUTPATIENT
Start: 2024-12-11 | End: 2024-12-12 | Stop reason: HOSPADM

## 2024-12-11 RX ORDER — HYDROMORPHONE HYDROCHLORIDE 0.2 MG/ML
0.2 INJECTION INTRAMUSCULAR; INTRAVENOUS; SUBCUTANEOUS EVERY 5 MIN PRN
Status: DISCONTINUED | OUTPATIENT
Start: 2024-12-11 | End: 2024-12-11 | Stop reason: HOSPADM

## 2024-12-11 RX ORDER — OXYCODONE HYDROCHLORIDE 10 MG/1
10 TABLET ORAL EVERY 6 HOURS PRN
Status: DISCONTINUED | OUTPATIENT
Start: 2024-12-11 | End: 2024-12-12 | Stop reason: HOSPADM

## 2024-12-11 RX ORDER — SODIUM CHLORIDE, SODIUM LACTATE, POTASSIUM CHLORIDE, CALCIUM CHLORIDE 600; 310; 30; 20 MG/100ML; MG/100ML; MG/100ML; MG/100ML
INJECTION, SOLUTION INTRAVENOUS CONTINUOUS PRN
Status: DISCONTINUED | OUTPATIENT
Start: 2024-12-11 | End: 2024-12-11

## 2024-12-11 RX ORDER — ONDANSETRON HYDROCHLORIDE 2 MG/ML
4 INJECTION, SOLUTION INTRAVENOUS EVERY 8 HOURS PRN
Status: DISCONTINUED | OUTPATIENT
Start: 2024-12-11 | End: 2024-12-12 | Stop reason: HOSPADM

## 2024-12-11 RX ORDER — LIDOCAINE HYDROCHLORIDE 20 MG/ML
INJECTION, SOLUTION EPIDURAL; INFILTRATION; INTRACAUDAL; PERINEURAL AS NEEDED
Status: DISCONTINUED | OUTPATIENT
Start: 2024-12-11 | End: 2024-12-11

## 2024-12-11 RX ORDER — POLYETHYLENE GLYCOL 3350 17 G/17G
17 POWDER, FOR SOLUTION ORAL DAILY
Status: DISCONTINUED | OUTPATIENT
Start: 2024-12-11 | End: 2024-12-12 | Stop reason: HOSPADM

## 2024-12-11 RX ORDER — ACETAMINOPHEN 325 MG/1
975 TABLET ORAL ONCE
Status: COMPLETED | OUTPATIENT
Start: 2024-12-11 | End: 2024-12-11

## 2024-12-11 RX ORDER — OXYCODONE HYDROCHLORIDE 5 MG/1
5 TABLET ORAL EVERY 6 HOURS PRN
Qty: 12 TABLET | Refills: 0 | Status: SHIPPED | OUTPATIENT
Start: 2024-12-11 | End: 2024-12-15

## 2024-12-11 RX ORDER — SIMETHICONE 80 MG
160 TABLET,CHEWABLE ORAL
Status: DISCONTINUED | OUTPATIENT
Start: 2024-12-11 | End: 2024-12-12 | Stop reason: HOSPADM

## 2024-12-11 RX ORDER — OXYCODONE HYDROCHLORIDE 5 MG/1
5 TABLET ORAL EVERY 4 HOURS PRN
Status: DISCONTINUED | OUTPATIENT
Start: 2024-12-11 | End: 2024-12-11 | Stop reason: HOSPADM

## 2024-12-11 RX ORDER — ONDANSETRON HYDROCHLORIDE 2 MG/ML
4 INJECTION, SOLUTION INTRAVENOUS ONCE AS NEEDED
Status: DISCONTINUED | OUTPATIENT
Start: 2024-12-11 | End: 2024-12-11 | Stop reason: HOSPADM

## 2024-12-11 RX ORDER — AMLODIPINE BESYLATE 10 MG/1
10 TABLET ORAL DAILY
Status: DISCONTINUED | OUTPATIENT
Start: 2024-12-12 | End: 2024-12-12 | Stop reason: HOSPADM

## 2024-12-11 RX ORDER — TALC
3 POWDER (GRAM) TOPICAL NIGHTLY PRN
Status: DISCONTINUED | OUTPATIENT
Start: 2024-12-11 | End: 2024-12-12 | Stop reason: HOSPADM

## 2024-12-11 RX ORDER — HYDROMORPHONE HYDROCHLORIDE 1 MG/ML
INJECTION, SOLUTION INTRAMUSCULAR; INTRAVENOUS; SUBCUTANEOUS AS NEEDED
Status: DISCONTINUED | OUTPATIENT
Start: 2024-12-11 | End: 2024-12-11

## 2024-12-11 RX ORDER — ACETAMINOPHEN 325 MG/1
975 TABLET ORAL EVERY 6 HOURS
Status: DISCONTINUED | OUTPATIENT
Start: 2024-12-11 | End: 2024-12-12 | Stop reason: HOSPADM

## 2024-12-11 RX ORDER — ALBUTEROL SULFATE 0.83 MG/ML
2.5 SOLUTION RESPIRATORY (INHALATION) ONCE AS NEEDED
Status: DISCONTINUED | OUTPATIENT
Start: 2024-12-11 | End: 2024-12-11 | Stop reason: HOSPADM

## 2024-12-11 RX ORDER — NEOSTIGMINE METHYLSULFATE 1 MG/ML
INJECTION INTRAVENOUS AS NEEDED
Status: DISCONTINUED | OUTPATIENT
Start: 2024-12-11 | End: 2024-12-11

## 2024-12-11 RX ORDER — MIDAZOLAM HYDROCHLORIDE 1 MG/ML
INJECTION, SOLUTION INTRAMUSCULAR; INTRAVENOUS AS NEEDED
Status: DISCONTINUED | OUTPATIENT
Start: 2024-12-11 | End: 2024-12-11

## 2024-12-11 RX ORDER — LABETALOL HYDROCHLORIDE 5 MG/ML
5 INJECTION, SOLUTION INTRAVENOUS ONCE AS NEEDED
Status: DISCONTINUED | OUTPATIENT
Start: 2024-12-11 | End: 2024-12-11 | Stop reason: HOSPADM

## 2024-12-11 RX ORDER — PROPOFOL 10 MG/ML
INJECTION, EMULSION INTRAVENOUS AS NEEDED
Status: DISCONTINUED | OUTPATIENT
Start: 2024-12-11 | End: 2024-12-11

## 2024-12-11 RX ORDER — SODIUM CHLORIDE, SODIUM LACTATE, POTASSIUM CHLORIDE, CALCIUM CHLORIDE 600; 310; 30; 20 MG/100ML; MG/100ML; MG/100ML; MG/100ML
100 INJECTION, SOLUTION INTRAVENOUS CONTINUOUS
Status: DISCONTINUED | OUTPATIENT
Start: 2024-12-11 | End: 2024-12-11 | Stop reason: HOSPADM

## 2024-12-11 RX ORDER — SODIUM CHLORIDE, SODIUM LACTATE, POTASSIUM CHLORIDE, CALCIUM CHLORIDE 600; 310; 30; 20 MG/100ML; MG/100ML; MG/100ML; MG/100ML
75 INJECTION, SOLUTION INTRAVENOUS CONTINUOUS
Status: DISCONTINUED | OUTPATIENT
Start: 2024-12-11 | End: 2024-12-12

## 2024-12-11 RX ORDER — HYDRALAZINE HYDROCHLORIDE 20 MG/ML
10 INJECTION INTRAMUSCULAR; INTRAVENOUS EVERY 6 HOURS PRN
Status: DISCONTINUED | OUTPATIENT
Start: 2024-12-11 | End: 2024-12-12 | Stop reason: HOSPADM

## 2024-12-11 RX ORDER — HYDRALAZINE HYDROCHLORIDE 20 MG/ML
5 INJECTION INTRAMUSCULAR; INTRAVENOUS EVERY 30 MIN PRN
Status: DISCONTINUED | OUTPATIENT
Start: 2024-12-11 | End: 2024-12-11 | Stop reason: HOSPADM

## 2024-12-11 RX ORDER — ROCURONIUM BROMIDE 50 MG/5 ML
SYRINGE (ML) INTRAVENOUS AS NEEDED
Status: DISCONTINUED | OUTPATIENT
Start: 2024-12-11 | End: 2024-12-11

## 2024-12-11 RX ORDER — ONDANSETRON 4 MG/1
4 TABLET, ORALLY DISINTEGRATING ORAL EVERY 8 HOURS PRN
Status: DISCONTINUED | OUTPATIENT
Start: 2024-12-11 | End: 2024-12-12 | Stop reason: HOSPADM

## 2024-12-11 RX ORDER — ONDANSETRON HYDROCHLORIDE 2 MG/ML
INJECTION, SOLUTION INTRAVENOUS AS NEEDED
Status: DISCONTINUED | OUTPATIENT
Start: 2024-12-11 | End: 2024-12-11

## 2024-12-11 RX ORDER — DIPHENHYDRAMINE HYDROCHLORIDE 50 MG/ML
12.5 INJECTION INTRAMUSCULAR; INTRAVENOUS ONCE AS NEEDED
Status: DISCONTINUED | OUTPATIENT
Start: 2024-12-11 | End: 2024-12-11 | Stop reason: HOSPADM

## 2024-12-11 RX ORDER — LIDOCAINE HYDROCHLORIDE 10 MG/ML
0.1 INJECTION, SOLUTION INFILTRATION; PERINEURAL ONCE
Status: DISCONTINUED | OUTPATIENT
Start: 2024-12-11 | End: 2024-12-11 | Stop reason: HOSPADM

## 2024-12-11 RX ORDER — OXYCODONE HYDROCHLORIDE 5 MG/1
5 TABLET ORAL EVERY 6 HOURS PRN
Status: DISCONTINUED | OUTPATIENT
Start: 2024-12-11 | End: 2024-12-12 | Stop reason: HOSPADM

## 2024-12-11 RX ORDER — GLYCOPYRROLATE 0.2 MG/ML
INJECTION INTRAMUSCULAR; INTRAVENOUS AS NEEDED
Status: DISCONTINUED | OUTPATIENT
Start: 2024-12-11 | End: 2024-12-11

## 2024-12-11 RX ORDER — FENTANYL CITRATE 50 UG/ML
INJECTION, SOLUTION INTRAMUSCULAR; INTRAVENOUS AS NEEDED
Status: DISCONTINUED | OUTPATIENT
Start: 2024-12-11 | End: 2024-12-11

## 2024-12-11 RX ORDER — POLYETHYLENE GLYCOL 3350 17 G/17G
17 POWDER, FOR SOLUTION ORAL DAILY
Qty: 10 PACKET | Refills: 0 | Status: SHIPPED | OUTPATIENT
Start: 2024-12-11 | End: 2024-12-22

## 2024-12-11 RX ORDER — DEXAMETHASONE SODIUM PHOSPHATE 10 MG/ML
INJECTION INTRAMUSCULAR; INTRAVENOUS AS NEEDED
Status: DISCONTINUED | OUTPATIENT
Start: 2024-12-11 | End: 2024-12-11

## 2024-12-11 RX ORDER — ACETAMINOPHEN 325 MG/1
975 TABLET ORAL ONCE
Status: DISCONTINUED | OUTPATIENT
Start: 2024-12-11 | End: 2024-12-11 | Stop reason: HOSPADM

## 2024-12-11 SDOH — ECONOMIC STABILITY: HOUSING INSECURITY: IN THE LAST 12 MONTHS, WAS THERE A TIME WHEN YOU WERE NOT ABLE TO PAY THE MORTGAGE OR RENT ON TIME?: NO

## 2024-12-11 SDOH — ECONOMIC STABILITY: HOUSING INSECURITY: AT ANY TIME IN THE PAST 12 MONTHS, WERE YOU HOMELESS OR LIVING IN A SHELTER (INCLUDING NOW)?: NO

## 2024-12-11 SDOH — ECONOMIC STABILITY: INCOME INSECURITY: IN THE PAST 12 MONTHS HAS THE ELECTRIC, GAS, OIL, OR WATER COMPANY THREATENED TO SHUT OFF SERVICES IN YOUR HOME?: NO

## 2024-12-11 SDOH — SOCIAL STABILITY: SOCIAL INSECURITY
WITHIN THE LAST YEAR, HAVE YOU BEEN RAPED OR FORCED TO HAVE ANY KIND OF SEXUAL ACTIVITY BY YOUR PARTNER OR EX-PARTNER?: NO

## 2024-12-11 SDOH — SOCIAL STABILITY: SOCIAL INSECURITY: HAS ANYONE EVER THREATENED TO HURT YOUR FAMILY OR YOUR PETS?: NO

## 2024-12-11 SDOH — SOCIAL STABILITY: SOCIAL INSECURITY: DOES ANYONE TRY TO KEEP YOU FROM HAVING/CONTACTING OTHER FRIENDS OR DOING THINGS OUTSIDE YOUR HOME?: NO

## 2024-12-11 SDOH — SOCIAL STABILITY: SOCIAL INSECURITY: WITHIN THE LAST YEAR, HAVE YOU BEEN HUMILIATED OR EMOTIONALLY ABUSED IN OTHER WAYS BY YOUR PARTNER OR EX-PARTNER?: NO

## 2024-12-11 SDOH — ECONOMIC STABILITY: TRANSPORTATION INSECURITY: IN THE PAST 12 MONTHS, HAS LACK OF TRANSPORTATION KEPT YOU FROM MEDICAL APPOINTMENTS OR FROM GETTING MEDICATIONS?: NO

## 2024-12-11 SDOH — ECONOMIC STABILITY: FOOD INSECURITY: WITHIN THE PAST 12 MONTHS, THE FOOD YOU BOUGHT JUST DIDN'T LAST AND YOU DIDN'T HAVE MONEY TO GET MORE.: NEVER TRUE

## 2024-12-11 SDOH — SOCIAL STABILITY: SOCIAL INSECURITY: ABUSE: ADULT

## 2024-12-11 SDOH — SOCIAL STABILITY: SOCIAL INSECURITY: DO YOU FEEL UNSAFE GOING BACK TO THE PLACE WHERE YOU ARE LIVING?: NO

## 2024-12-11 SDOH — ECONOMIC STABILITY: FOOD INSECURITY: WITHIN THE PAST 12 MONTHS, YOU WORRIED THAT YOUR FOOD WOULD RUN OUT BEFORE YOU GOT THE MONEY TO BUY MORE.: NEVER TRUE

## 2024-12-11 SDOH — SOCIAL STABILITY: SOCIAL INSECURITY: WITHIN THE LAST YEAR, HAVE YOU BEEN AFRAID OF YOUR PARTNER OR EX-PARTNER?: NO

## 2024-12-11 SDOH — SOCIAL STABILITY: SOCIAL INSECURITY: ARE THERE ANY APPARENT SIGNS OF INJURIES/BEHAVIORS THAT COULD BE RELATED TO ABUSE/NEGLECT?: NO

## 2024-12-11 SDOH — SOCIAL STABILITY: SOCIAL INSECURITY
WITHIN THE LAST YEAR, HAVE YOU BEEN KICKED, HIT, SLAPPED, OR OTHERWISE PHYSICALLY HURT BY YOUR PARTNER OR EX-PARTNER?: NO

## 2024-12-11 SDOH — SOCIAL STABILITY: SOCIAL INSECURITY: WERE YOU ABLE TO COMPLETE ALL THE BEHAVIORAL HEALTH SCREENINGS?: YES

## 2024-12-11 SDOH — HEALTH STABILITY: MENTAL HEALTH: CURRENT SMOKER: 0

## 2024-12-11 SDOH — SOCIAL STABILITY: SOCIAL INSECURITY: HAVE YOU HAD THOUGHTS OF HARMING ANYONE ELSE?: NO

## 2024-12-11 SDOH — SOCIAL STABILITY: SOCIAL INSECURITY: DO YOU FEEL ANYONE HAS EXPLOITED OR TAKEN ADVANTAGE OF YOU FINANCIALLY OR OF YOUR PERSONAL PROPERTY?: NO

## 2024-12-11 SDOH — ECONOMIC STABILITY: HOUSING INSECURITY: IN THE PAST 12 MONTHS, HOW MANY TIMES HAVE YOU MOVED WHERE YOU WERE LIVING?: 1

## 2024-12-11 SDOH — SOCIAL STABILITY: SOCIAL INSECURITY: ARE YOU OR HAVE YOU BEEN THREATENED OR ABUSED PHYSICALLY, EMOTIONALLY, OR SEXUALLY BY ANYONE?: NO

## 2024-12-11 ASSESSMENT — PAIN SCALES - GENERAL
PAINLEVEL_OUTOF10: 5 - MODERATE PAIN
PAINLEVEL_OUTOF10: 4
PAINLEVEL_OUTOF10: 4
PAINLEVEL_OUTOF10: 5 - MODERATE PAIN
PAINLEVEL_OUTOF10: 2
PAINLEVEL_OUTOF10: 3
PAINLEVEL_OUTOF10: 5 - MODERATE PAIN
PAINLEVEL_OUTOF10: 4

## 2024-12-11 ASSESSMENT — COGNITIVE AND FUNCTIONAL STATUS - GENERAL
MOBILITY SCORE: 19
TURNING FROM BACK TO SIDE WHILE IN FLAT BAD: A LITTLE
DAILY ACTIVITIY SCORE: 16
MOVING TO AND FROM BED TO CHAIR: A LITTLE
TOILETING: A LOT
PATIENT BASELINE BEDBOUND: NO
STANDING UP FROM CHAIR USING ARMS: A LITTLE
MOVING FROM LYING ON BACK TO SITTING ON SIDE OF FLAT BED WITH BEDRAILS: A LITTLE
DRESSING REGULAR LOWER BODY CLOTHING: A LITTLE
PERSONAL GROOMING: A LITTLE
WALKING IN HOSPITAL ROOM: A LOT
TURNING FROM BACK TO SIDE WHILE IN FLAT BAD: A LITTLE
HELP NEEDED FOR BATHING: A LITTLE
CLIMB 3 TO 5 STEPS WITH RAILING: A LITTLE
HELP NEEDED FOR BATHING: A LITTLE
DRESSING REGULAR UPPER BODY CLOTHING: A LITTLE
DRESSING REGULAR LOWER BODY CLOTHING: A LOT
MOVING TO AND FROM BED TO CHAIR: A LOT
DAILY ACTIVITIY SCORE: 20
MOBILITY SCORE: 14
EATING MEALS: A LITTLE
CLIMB 3 TO 5 STEPS WITH RAILING: A LOT
DRESSING REGULAR UPPER BODY CLOTHING: A LITTLE
WALKING IN HOSPITAL ROOM: A LITTLE
TOILETING: A LITTLE
STANDING UP FROM CHAIR USING ARMS: A LOT

## 2024-12-11 ASSESSMENT — LIFESTYLE VARIABLES
HOW OFTEN DO YOU HAVE A DRINK CONTAINING ALCOHOL: NEVER
AUDIT-C TOTAL SCORE: 0
AUDIT-C TOTAL SCORE: 0
SKIP TO QUESTIONS 9-10: 1
HOW MANY STANDARD DRINKS CONTAINING ALCOHOL DO YOU HAVE ON A TYPICAL DAY: PATIENT DOES NOT DRINK
HOW OFTEN DO YOU HAVE 6 OR MORE DRINKS ON ONE OCCASION: NEVER

## 2024-12-11 ASSESSMENT — PAIN SCALES - PAIN ASSESSMENT IN ADVANCED DEMENTIA (PAINAD)
BODYLANGUAGE: RELAXED
TOTALSCORE: 0
CONSOLABILITY: NO NEED TO CONSOLE
BREATHING: NORMAL
TOTALSCORE: MEDICATION (SEE MAR)
FACIALEXPRESSION: SMILING OR INEXPRESSIVE

## 2024-12-11 ASSESSMENT — ACTIVITIES OF DAILY LIVING (ADL)
FEEDING YOURSELF: INDEPENDENT
WALKS IN HOME: INDEPENDENT
HEARING - RIGHT EAR: FUNCTIONAL
ADEQUATE_TO_COMPLETE_ADL: YES
LACK_OF_TRANSPORTATION: NO
LACK_OF_TRANSPORTATION: NO
HEARING - LEFT EAR: FUNCTIONAL
JUDGMENT_ADEQUATE_SAFELY_COMPLETE_DAILY_ACTIVITIES: YES
PATIENT'S MEMORY ADEQUATE TO SAFELY COMPLETE DAILY ACTIVITIES?: YES
BATHING: INDEPENDENT
LACK_OF_TRANSPORTATION: NO
TOILETING: INDEPENDENT
DRESSING YOURSELF: INDEPENDENT
GROOMING: INDEPENDENT

## 2024-12-11 ASSESSMENT — PAIN - FUNCTIONAL ASSESSMENT
PAIN_FUNCTIONAL_ASSESSMENT: 0-10

## 2024-12-11 ASSESSMENT — PAIN DESCRIPTION - LOCATION
LOCATION: ABDOMEN
LOCATION: ABDOMEN

## 2024-12-11 ASSESSMENT — PATIENT HEALTH QUESTIONNAIRE - PHQ9
SUM OF ALL RESPONSES TO PHQ9 QUESTIONS 1 & 2: 0
2. FEELING DOWN, DEPRESSED OR HOPELESS: NOT AT ALL
1. LITTLE INTEREST OR PLEASURE IN DOING THINGS: NOT AT ALL

## 2024-12-11 ASSESSMENT — PAIN DESCRIPTION - DESCRIPTORS: DESCRIPTORS: DISCOMFORT;SORE

## 2024-12-11 ASSESSMENT — COLUMBIA-SUICIDE SEVERITY RATING SCALE - C-SSRS
1. IN THE PAST MONTH, HAVE YOU WISHED YOU WERE DEAD OR WISHED YOU COULD GO TO SLEEP AND NOT WAKE UP?: NO
2. HAVE YOU ACTUALLY HAD ANY THOUGHTS OF KILLING YOURSELF?: NO
6. HAVE YOU EVER DONE ANYTHING, STARTED TO DO ANYTHING, OR PREPARED TO DO ANYTHING TO END YOUR LIFE?: NO

## 2024-12-11 ASSESSMENT — PAIN SCALES - WONG BAKER: WONGBAKER_NUMERICALRESPONSE: HURTS LITTLE MORE

## 2024-12-11 ASSESSMENT — PAIN DESCRIPTION - ORIENTATION: ORIENTATION: ANTERIOR

## 2024-12-11 NOTE — OP NOTE
Robotic radical prostatectomy, bilateral pelvic lymph node dissection Operative Note     Date: 2024  OR Location: [facility] OR    Name: Jatinder Estrada, : 1950, Age: 74 y.o., MRN: [id number], Sex: male    Diagnosis  Pre-op Diagnosis      * Prostate cancer (Multi) [C61] Post-op Diagnosis     * Prostate cancer (Multi) [C61]     Procedures  Robotic radical prostatectomy  Bilateral pelvic lymph node dissection  34926 - OR LAPS SURG [id number] RPBIC RAD W/NRV SPARING ROBOT      Surgeons      * Benito Bowman - Primary    Resident/Fellow/Other Assistant:  Surgeons and Role:     * Fabio Price MD - Resident - Assisting    Staff:   Circulator: Francisca Coyne Person: Sandy  Surgical Assistant: Leann Fajardo Circulator: German    Anesthesia Staff: Anesthesiologist: Raheem Rahman MD  C-AA: YVON Sulliavn    Procedure Summary  Anesthesia: General  ASA: II  Estimated Blood Loss: 450mL  Intra-op Medications:   Administrations occurring from 0955 to 1545 on 24:   Medication Name Total Dose   ceFAZolin (Ancef) 2 g in dextrose (iso)  mL 4 g   dexAMETHasone (Decadron) PF injection 10 mg/mL 10 mg   fentaNYL (Sublimaze) injection 50 mcg/mL 250 mcg   glycopyrrolate 0.2 mg/mL 0.2 mg   HYDROmorphone (Dilaudid) injection 1 mg/mL 1 mg   LR infusion Cannot be calculated   lidocaine PF (Xylocaine-MPF) local injection 2 % 80 mg   midazolam (Versed) injection 1 mg/mL 2 mg   ondansetron (Zofran) 2 mg/mL injection 4 mg   propofol (Diprivan) injection 10 mg/mL 200 mg   rocuronium (Zemuron) 50 mg/5 mL prefilled syringe 140 mg              Anesthesia Record               Intraprocedure I/O Totals          Intake    LR infusion 1000.00 mL    Total Intake 1000 mL       Output    Urine 400 mL    Est. Blood Loss 300 mL    Total Output 700 mL       Net    Net Volume 300 mL          Specimen:   ID Type Source Tests Collected by Time   1 : PROSTATE Tissue PROSTATE RADICAL PROSTATECTOMY SURGICAL PATHOLOGY EXAM Benito HENRIQUEZ  MD [place] 12/11/2024 1545   2 : BILATERAL PELVIC LYMPH NODES Tissue PELVIC LYMPH NODE DISSECTION BILATERAL SURGICAL PATHOLOGY EXAM Benito Bowman MD 12/11/2024 1545                 Drains and/or Catheters:   Urethral Catheter Latex 20 Fr. (Active)       [REMOVED] Urethral Catheter Non-latex 16 Fr. (Removed)       Tourniquet Times:         Implants:     Findings: Non-nerve sparing with wide excision of the seminal vesicle and surrounding tissue on right due to concern for MARLY on pre-operative MRI. Nerve-sparing on left.  Very large intravesical median lobe  Margins grossly negative  Anterior bladder neck repair due to redundancy from large median lobe  Watertight anastomosis    Indications: Jatinder Estrada is an 74 y.o. male who is having surgery for Prostate cancer (Multi) [C61].     The patient was seen in the preoperative area. The risks, benefits, complications, treatment options, non-operative alternatives, expected recovery and outcomes were discussed with the patient. The possibilities of reaction to medication, pulmonary aspiration, injury to surrounding structures, bleeding, recurrent infection, the need for additional procedures, failure to diagnose a condition, and creating a complication requiring transfusion or operation were discussed with the patient. The patient concurred with the proposed plan, giving informed consent.  The site of surgery was properly noted/marked if necessary per policy. The patient has been actively warmed in preoperative area. Preoperative antibiotics have been ordered and given within 1 hours of incision. Venous thrombosis prophylaxis have been ordered including bilateral sequential compression devices    Procedure Details:   The patient was seen in the preoperative area where the risks, benefits, indications of the procedure were discussed with the patient. The patient agreed to proceed with the procedure.   At this time, he was taken back to the operating room where general  anesthesia was induced. He was placed in a supine position and prepped and draped in a standard sterile fashion. Subsequently, am 8mm vertical incision was made two fingerbreadths superior to the umbilicus. A Veress needle was utilized to gain access to the abdomen. A drop test was performed which was satisfactory and insufflation was started, which demonstrated low pressures. The abdomen was insufflated up to 12 mmHg. We then inserted the 8mm camera port into the midline and the laparoscopy was performed, which did not demonstrate any injury to the surrounding viscera.    We then placed an additional three 8-mm robotic-arm ports and a 12 mm assistant port under direct vision on the right side.  We then docked the robot and began our procedure.    We turned our attention to the posterior dissection. The peritoneum was excised at the level of the seminal vesicles 1 cm cephalad to the demarcation between the peritoneal fat and mesenteric fat of the rectum.  The seminal vesicle was identified and dissected out on the right side, please note that laterally and towards the prostate side of the seminal vesicle, I went wired and included a large amount of BANDAR prostatic soft tissue to prevent positive margins in this area.  The vas was dissected medial to lateral. It was then transected where the tip of SV was seen on the right side. The vascular pedicle to the SV was taken down using bipolar cautery. This was repeat on the left side.     Denonvilliers fascia was taken down sharply. The posterior space was bluntly dissected towards the apex of the prostate. We then turned our attention towards the anterior dissection. The space of Retzius was developed using a combination sharp and blunt dissection and developed down to the endopelvic fascia.  The bilateral vas deferens were identified and were transected with electrocautery. The bilateral median umbilical ligaments were ligated with electrocautery and transected sharply.  We removed any fat off the anterior prostate. We then sharply divided the bilateral endopelvic fascia. The levator muscles were mobilized off the prostate with a combination of sharp and blunt dissection. We then placed a 0 Vicryl DVC stitch.       At this time, the bladder neck was incised and the anterior bladder neck was , paying careful attention not to stretch the anatomy of the bladder neck. There was a prominent median lobe which was grasped with the robot tenaculum and elevated. Subsequently, the posterior bladder neck was also dissected until the underlying vasa were identified. The right vas and seminal Vesicle were dissected free up to the base of the prostate.  We additionally did this the same with the left vas and seminal vesicle.       We then performed left side nerve sparing.  On the right, the neurovascular bundle was not spared due to extensive cancer along that side.  The pedicles bilaterally were transected utilizing clips. We did pay careful attention to the rectum to not cause injury. Once this was done, the prostate was freed all the way to the apex.  The DVC was transected using electrocautery. We then developed the urethra by taking the periurethral tissue using sharp dissection. We then transected the urethra using sharp dissection. The prostate was noted to be completely free at this point and it was placed in a specimen bag. We then examined our prostatectomy bed, and adequate hemostasis was noted.     We then began our bilateral lymph node dissection. The lymph node packets from the external iliac and obturator fossas were removed using a combination of blunt and sharp dissection. The bilateral obturator nerves were identified and uninvolved in our dissection. After ensuring hemostasis, these packets were sent off for pathological analysis.       We then used two 3-0 V-Locs in simple running fashion to complete our vesicourethral anastomosis as well as perform bladder neck  reconstruction. Upon completion, these stitches were tied down to each other. A new 20F ingram catheter was inserted. The anastamosis was tested with 120cc normal saline irrigation and was watertight. 15cc's of sterile water were inflated in the ingram balloon. Leak test was negative so no drain was placed.     The robot was undocked. Then all the ports were removed. The assistant port site fascia was closed with a 0 Vicryl suture. We then extracted the specimen by extending our midline port site using electrocautery. The fascia for the midline wound was closed with #0 Vicryl in bidirectional running fashion. The skin for all the wounds was closed with 4-0 vicryl in subcuticular running fashion. Dermabond were applied to all the wounds. This concluded our procedure. The patient tolerated the procedure well without any complications. The patient was awoken from anesthesia in stable condition and transferred to the PACU.    Complications:  None; patient tolerated the procedure well.    Disposition: PACU - hemodynamically stable.  Condition: stable         Task Performed by RNFA or Surgical Assistant:  Positioning, skin prep, port placement, bedside assisting and exchange of robotic instruments          Additional Details:     Attending Attestation:     Benito Bowman  Phone Number: [phone]

## 2024-12-11 NOTE — ANESTHESIA POSTPROCEDURE EVALUATION
Patient: Jatinder Estrada    Procedure Summary       Date: 12/11/24 Room / Location: [facility] OR 08 / Virtual [facility] OR    Anesthesia Start: 1003 Anesthesia Stop: 1619    Procedure: Robotic radical prostatectomy Diagnosis:       Prostate cancer (Multi)      (Prostate cancer (Multi) [C61])    Surgeons: Benito Bowman MD Responsible Provider: Raheem Rahman MD    Anesthesia Type: general ASA Status: 2            Anesthesia Type: general    Vitals Value Taken Time   BP 90/45 12/11/24 1615   Temp 36.3 12/11/24 1619   Pulse 96 12/11/24 1619   Resp 12 12/11/24 1619   SpO2 95 % 12/11/24 1617   Vitals shown include unfiled device data.    Anesthesia Post Evaluation    Patient location during evaluation: PACU  Patient participation: complete - patient participated  Level of consciousness: awake and alert  Pain management: satisfactory to patient  Airway patency: patent  Cardiovascular status: acceptable  Respiratory status: acceptable, spontaneous ventilation, room air and nonlabored ventilation  Hydration status: acceptable  Postoperative Nausea and Vomiting: none        No notable events documented.

## 2024-12-11 NOTE — CARE PLAN
The patient's goals for the shift include pain control     The clinical goals for the shift include pain control

## 2024-12-11 NOTE — ANESTHESIA PREPROCEDURE EVALUATION
Patient: Jatinder Estrada    Procedure Information       Date/Time: 12/11/24 0955    Procedure: Robotic radical prostatectomy    Location: [facility] OR 08 / [facility]    Surgeons: Benito Bowman MD            Relevant Problems   Cardiac   (+) Essential hypertension      /Renal   (+) Benign prostatic hyperplasia with urinary frequency   (+) Prostate cancer (Multi)       Clinical information reviewed:   Tobacco  Allergies  Meds   Med Hx  Surg Hx   Fam Hx  Soc Hx        NPO Detail:  NPO/Void Status  Date of Last Liquid: 12/11/24  Time of Last Liquid: 0600  Date of Last Solid: 12/10/24  Time of Last Solid: 1900  Last Intake Type: Clear fluids  Time of Last Void: 0845         Physical Exam    Airway  Mallampati: II  TM distance: >3 FB     Cardiovascular   Rhythm: regular  Rate: normal     Dental   (+) lower dentures     Pulmonary   Breath sounds clear to auscultation     Abdominal            Anesthesia Plan    History of general anesthesia?: yes  History of complications of general anesthesia?: no    ASA 2     general     The patient is not a current smoker.    intravenous induction   Postoperative administration of opioids is intended.  Anesthetic plan and risks discussed with patient.  Use of blood products discussed with patient who did not consent to blood products.    Plan discussed with CAA.    Patient will accept albumin products if needed

## 2024-12-11 NOTE — ANESTHESIA PROCEDURE NOTES
Peripheral IV  Date/Time: 12/11/2024 10:19 AM  Inserted by: YVON Sullivan    Placement  Needle size: 16 G  Laterality: right  Location: forearm  Local anesthetic: none  Site prep: alcohol  Technique: anatomical landmarks  Attempts: 1

## 2024-12-11 NOTE — ANESTHESIA PROCEDURE NOTES
Airway  Date/Time: 12/11/2024 10:14 AM  Urgency: elective    Airway not difficult    Staffing  Performed: YVON   Authorized by: Raheem Rahman MD    Performed by: YVON Sullivan  Patient location during procedure: OR    Indications and Patient Condition  Indications for airway management: anesthesia  Spontaneous Ventilation: absent  Sedation level: deep  Preoxygenated: yes  Patient position: sniffing  MILS maintained throughout  Mask difficulty assessment: 1 - vent by mask    Final Airway Details  Final airway type: endotracheal airway      Successful airway: ETT  Cuffed: yes   Successful intubation technique: video laryngoscopy  Facilitating devices/methods: intubating stylet  Endotracheal tube insertion site: oral  Blade: Kerwin  Blade size: #4  ETT size (mm): 7.5  Cormack-Lehane Classification: grade I - full view of glottis  Placement verified by: chest auscultation and capnometry   Cuff volume (mL): 6  Measured from: lips  ETT to lips (cm): 22  Number of attempts at approach: 1    Additional Comments  Lips and teeth in pre anesthetic conditions after laryngoscopy

## 2024-12-11 NOTE — DISCHARGE INSTRUCTIONS
DEPARTMENT OF Urology  DISCHARGE INSTRUCTIONS Prostatectomy  Inpatient Surgery    C O N F I D E N T I A L   I N F O R M A T I O N    Jatinder Estrada      Call [phone] during regular daytime business hours (8:00 am - 5:00 pm) and after 5:00 pm and ask for the Urology resident with any questions or concerns.    If it is a life-threatening situation, proceed to the nearest emergency department.        Follow-up appointment:    Future Appointments   Date Time Provider Department [place]   12/20/2024  9:00 AM UROLOGY [id number] NURSE [id number] [place]   12/30/2024  8:30 AM Benito Bowman MD [id number] [place]   5/19/2025 10:00 AM Buddy Salvador DO [id number] [place]          Thank you for the opportunity to care for you today.  Your health and healing are very important to us.  We hope we made you feel as comfortable as possible and are committed to your recovery and continued well-being.      The following is a brief overview of your prostatectomy procedure. Some of the information contained on this summary may be confidential.  This information should be kept in your records and should be shared with your regular doctor.    Physicians:   Dr. Bowman    Procedure performed: removal of your prostate and lymph nodes in the pelvis  Pending Results: pathology results    What to Expect During your Recovery and Home Care  Anesthesia Side Effects   You may feel sleepy, tired, or have a sore throat.   You may also feel drowsiness, dizziness, or inability to think clearly.  For your safety, do not drive, drink alcoholic beverages, take any unprescribed medication or make any important decisions for 24 hours after anesthesia.  A responsible adult should be with you for 24 hours.        Activity and Recovery    No heavy lifting greater than 10 pounds for the next 4-6 weeks. No driving until mobility has returned to normal. Do not drive or operate heavy machinery while taking narcotic pain medications as these medications can alter  perception, impair judgement, and slow reaction times.  Pain Control  Unfortunately, you may experience pain after your procedure. Adequate pain management can include alternative measures to help ease your pain and that can include over the counter Tylenol/ibuprofen or Oxycodone which can be taken as prescribed as needed for breakthrough pain. Do not take more than 4,000mg of Tylenol in a 24-hour period.      Your procedure was done robotically so you may experience gas pains from the surgery. Getting up and moving around is the best way to relieve those pains. You can also take some over the counter gas-x(simethicone).    Nausea/Vomiting   Clear liquids are best tolerated at first. Start slow, advance your diet as tolerated to normal foods. Avoid spicy, greasy, heavy foods at first. Also, you may feel nauseous or like you need to vomit if you take any type of medication on an empty stomach.  Call your physician if you are unable to eat or drink, are not passing gas and have persistent vomiting.    Signs of Bleeding   You could have some blood in your urine off and on over the next several weeks. Your urine will be light pink to yellow. Minor bleeding or drainage may occur from the surgical sites; however, excessive or consistent bleeding should be reported to your surgeon. Excessive bleeding is defined as blood that is dripping from wound, soaking you bandages, and is ketchup colored, thick with possible blood clots.  Consistent is defined as bleeding that does not stop.      Treatment/wound care:   Keep area(s) clean and dry.   It is okay to shower 24 hours after time of surgery.    Do not scrub wound(s), pat dry.    Do not submerge wound(s) in standing water until seen for follow up appointment (no tub bathing, swimming, or hot tubs).    Clean with mild soap, gentle washing, pat dry, cover with bandage as needed.    Avoid waterproof bandages.  No oils or lotions on incisions.  Staples/sutures removed in our  [id number] days after the date of surgery.  Do not remove the staples/sutures on your own.    Please visually inspect your wound(s) at least once daily.  If the wound(s) are in a difficult to see location, please use a mirror or have someone else assist with visual inspection.    Signs of Infection  Signs of infection can include fever, chills, redness around surgical incisions, green/yellow drainage from incisions, or severe abdominal pain.  If you see any of these occur, please contact your doctor's office at [phone].  Any fever higher than 100.4, especially if associated with an ill feeling, abdominal pain, chills, or nausea should be reported to your surgeon.      Assist in bowel movements/urination  Take daily stool softener you were prescribed  Increase fiber in diet  Increase water (6 to 8 glasses)  Increase walking   Can try adding over the counter MiraLAX or in extreme cases milk of magnesia.  If you have tried these methods and you are not passing gas, your bladder still feels full and you cannot have a bowel movement, please go to your nearest Emergency room/contact your physician.    Additional Instructions:   Use a small pillow to put pressure on your belly. This can make you more comfortable when you cough, laugh or do other actions.     CATHETER CARE  Always keep the catheters tubing and drainage bag below the level of your bladder.  Avoid loops and kinks in the catheter tubing.  NOTIFY your physician if catheter falls out or catheter seems clogged and urine is not draining.   Do not wear the small leg bag to bed you should be provided with a larger overnight bag that you should wear to bed and can hang over the side of the bed.  We recommend wearing the large bag in the shower as well as this is easy to dry, and you do not get your leg straps wet from your leg bag.   Your catheter should be secured to your upper thigh, do not allow it to hang or dangle.  You will have a Fluoroscopy study right  before your post-operative appointment, which will determine if there are any leaks and your catheter can come out that day.

## 2024-12-12 ENCOUNTER — PHARMACY VISIT (OUTPATIENT)
Dept: PHARMACY | Facility: CLINIC | Age: 74
End: 2024-12-12
Payer: COMMERCIAL

## 2024-12-12 VITALS
SYSTOLIC BLOOD PRESSURE: 147 MMHG | WEIGHT: 189 LBS | BODY MASS INDEX: 25.05 KG/M2 | TEMPERATURE: 97.9 F | RESPIRATION RATE: 16 BRPM | DIASTOLIC BLOOD PRESSURE: 78 MMHG | OXYGEN SATURATION: 98 % | HEART RATE: 82 BPM | HEIGHT: 73 IN

## 2024-12-12 LAB
ANION GAP SERPL CALC-SCNC: 12 MMOL/L (ref 10–20)
BUN SERPL-MCNC: 17 MG/DL (ref 6–23)
CALCIUM SERPL-MCNC: 8.6 MG/DL (ref 8.6–10.3)
CHLORIDE SERPL-SCNC: 106 MMOL/L (ref 98–107)
CO2 SERPL-SCNC: 24 MMOL/L (ref 21–32)
CREAT SERPL-MCNC: 1.27 MG/DL (ref 0.5–1.3)
EGFRCR SERPLBLD CKD-EPI 2021: 60 ML/MIN/1.73M*2
ERYTHROCYTE [DISTWIDTH] IN BLOOD BY AUTOMATED COUNT: 13 % (ref 11.5–14.5)
GLUCOSE SERPL-MCNC: 121 MG/DL (ref 74–99)
HCT VFR BLD AUTO: 39.5 % (ref 41–52)
HGB BLD-MCNC: 12.6 G/DL (ref 13.5–17.5)
MCH RBC QN AUTO: 26.8 PG (ref 26–34)
MCHC RBC AUTO-ENTMCNC: 31.9 G/DL (ref 32–36)
MCV RBC AUTO: 84 FL (ref 80–100)
NRBC BLD-RTO: 0 /100 WBCS (ref 0–0)
PLATELET # BLD AUTO: 272 X10*3/UL (ref 150–450)
POTASSIUM SERPL-SCNC: 4.4 MMOL/L (ref 3.5–5.3)
RBC # BLD AUTO: 4.7 X10*6/UL (ref 4.5–5.9)
SODIUM SERPL-SCNC: 138 MMOL/L (ref 136–145)
WBC # BLD AUTO: 10.1 X10*3/UL (ref 4.4–11.3)

## 2024-12-12 PROCEDURE — 82374 ASSAY BLOOD CARBON DIOXIDE: CPT

## 2024-12-12 PROCEDURE — 2500000004 HC RX 250 GENERAL PHARMACY W/ HCPCS (ALT 636 FOR OP/ED)

## 2024-12-12 PROCEDURE — RXMED WILLOW AMBULATORY MEDICATION CHARGE

## 2024-12-12 PROCEDURE — 85027 COMPLETE CBC AUTOMATED: CPT

## 2024-12-12 PROCEDURE — 7100000011 HC EXTENDED STAY RECOVERY HOURLY - NURSING UNIT

## 2024-12-12 PROCEDURE — 99239 HOSP IP/OBS DSCHRG MGMT >30: CPT | Performed by: NURSE PRACTITIONER

## 2024-12-12 PROCEDURE — 2500000001 HC RX 250 WO HCPCS SELF ADMINISTERED DRUGS (ALT 637 FOR MEDICARE OP)

## 2024-12-12 PROCEDURE — 36415 COLL VENOUS BLD VENIPUNCTURE: CPT

## 2024-12-12 RX ORDER — CIPROFLOXACIN 500 MG/1
500 TABLET ORAL 2 TIMES DAILY
Qty: 6 TABLET | Refills: 0 | Status: SHIPPED | OUTPATIENT
Start: 2024-12-12 | End: 2024-12-15

## 2024-12-12 ASSESSMENT — PAIN SCALES - GENERAL
PAINLEVEL_OUTOF10: 4
PAINLEVEL_OUTOF10: 7
PAINLEVEL_OUTOF10: 3
PAINLEVEL_OUTOF10: 3

## 2024-12-12 ASSESSMENT — PAIN DESCRIPTION - LOCATION: LOCATION: ABDOMEN

## 2024-12-12 ASSESSMENT — PAIN - FUNCTIONAL ASSESSMENT
PAIN_FUNCTIONAL_ASSESSMENT: 0-10

## 2024-12-12 NOTE — PROGRESS NOTES
Spiritual Care Visit  Spiritual Care Request    Reason for Visit:  Routine Visit: Introduction     Request Received From:       Focus of Care:  Visited With: Patient and family together         Refer to :          Spiritual Care Assessment    Spiritual Assessment:                      Care Provided:  Intended Effects: Promote sense of peace, Preserve dignity and respect, Meaning-making  Methods: Offer spiritual/Scientology support  Interventions: Share words of hope and inspiration    Sense of Community and or Mandaeism Affiliation:  Restoration         Addressed Needs/Concerns and/or Denzel Through:          Outcome:        Advance Directives:         Spiritual Care Annotation    Annotation:  Patient was surrounded by his family.   contacted a person from his narcisa group to visit him at his request.   was a supportive presence.

## 2024-12-12 NOTE — PROGRESS NOTES
12/12/24 1248   Discharge Planning   Living Arrangements Spouse/significant other   Support Systems Spouse/significant other   Type of Residence Private residence   Number of Stairs to Enter Residence 2   Number of Stairs Within Residence 0   Home or Post Acute Services None   Expected Discharge Disposition Home   Does the patient need discharge transport arranged? No   Financial Resource Strain   How hard is it for you to pay for the very basics like food, housing, medical care, and heating? Not hard   Stroke Family Assessment   Stroke Family Assessment Needed No   Intensity of Service   Intensity of Service 0-30 min     Met with pt and wife to review dc planning. Pt states we can speak freely.The pt lives at home with his wife in a ranch style home. States no needs for dc planning. Will go home with ingram in place and states he feels comfortable with its care. PCP is Dr. Salvador and prescriptions are filled at [facility] pharmacy with no barrier. The pt denies difficulty with living expenses such as utilities/prescriptions/groceries. Has a ride home. The pt requests info for medical transport in the future- advised him to reach out to his insurance to find out if anything is covered/provided thru them.

## 2024-12-12 NOTE — DISCHARGE SUMMARY
Discharge Diagnosis  Prostate cancer (Multi)    Issues Requiring Follow-Up  Catheter removal, pathology review    Test Results Pending At Discharge  Pending Labs       Order Current Status    Surgical Pathology Exam In process            Hospital Course   Patient underwent uncomplicated radical prostatectomy with bilateral pelvic lymphadenectomy.  He was admitted postoperatively.  He had an unremarkable postoperative course and convalesced appropriately.  His labs, vitals, exam were reassuring on postop day 1 he was deemed fit for discharge to home.    Pertinent Physical Exam At Time of Discharge  Physical Exam    Home Medications     Medication List      START taking these medications     acetaminophen 325 mg tablet; Commonly known as: TylenoL; Take 2 tablets   (650 mg) by mouth every 6 hours if needed for mild pain (1 - 3) for up to   7 days.   ciprofloxacin 500 mg tablet; Commonly known as: Cipro; Take 1 tablet   (500 mg) by mouth 2 times a day for 3 days. Start day prior to ingram   catheter removal   Eliquis 2.5 mg tablet; Generic drug: apixaban; Take 1 tablet (2.5 mg) by   mouth 2 times a day for 28 days.   oxyCODONE 5 mg immediate release tablet; Commonly known as: Roxicodone;   Take 1 tablet (5 mg) by mouth every 6 hours if needed for severe pain (7 -   10) for up to 3 days.   polyethylene glycol 17 gram packet; Commonly known as: Glycolax,   Miralax; Take 1 packet (17 g) mixed with 4 to 6 ounces of water or other   beverage by mouth once daily for 10 days.     CONTINUE taking these medications     amLODIPine 10 mg tablet; Commonly known as: Norvasc; Take 1 tablet (10   mg) by mouth once daily.   cholecalciferol 50 MCG (2000 UT) tablet; Commonly known as: Vitamin D-3   Depend Easy Fit Undergarments misc; Generic drug:   diaper,brief,adult,disposable; 3 times daily as needed   valsartan 160 mg tablet; Commonly known as: Diovan; Take 1 tablet (160   mg) by mouth once daily.     STOP taking these medications      dutasteride 0.5 mg capsule; Commonly known as: Avodart   terazosin 2 mg capsule; Commonly known as: Hytrin       Outpatient Follow-Up  Future Appointments   Date Time Provider Department Center   12/20/2024  9:00 AM UROLOGY [id number] NURSE [id number] [place]   12/30/2024  8:30 AM Benito Bowman MD [id number] [place]   5/19/2025 10:00 AM Buddy Salvador DO [id number] [place]       Benito Bowman MD

## 2024-12-12 NOTE — CARE PLAN
Problem: Pain - Adult  Goal: Verbalizes/displays adequate comfort level or baseline comfort level  Outcome: Progressing     Problem: Safety - Adult  Goal: Free from fall injury  Outcome: Progressing     Problem: Discharge Planning  Goal: Discharge to home or other facility with appropriate resources  Outcome: Progressing     Problem: Chronic Conditions and Co-morbidities  Goal: Patient's chronic conditions and co-morbidity symptoms are monitored and maintained or improved  Outcome: Progressing     Problem: Pain  Goal: Takes deep breaths with improved pain control throughout the shift  Outcome: Progressing  Goal: Turns in bed with improved pain control throughout the shift  Outcome: Progressing  Goal: Walks with improved pain control throughout the shift  Outcome: Progressing  Goal: Performs ADL's with improved pain control throughout shift  Outcome: Progressing  Goal: Participates in PT with improved pain control throughout the shift  Outcome: Progressing  Goal: Free from opioid side effects throughout the shift  Outcome: Progressing  Goal: Free from acute confusion related to pain meds throughout the shift  Outcome: Progressing   The patient's goals for the shift include      The clinical goals for the shift include Pt will have minimal pain and receive adequate rest.    Over the shift, the patient did receive adequate rest and had minimal pain.

## 2024-12-12 NOTE — PROGRESS NOTES
Jatinder Estrada 74 y.o. male    Subjective  Patient seen and examined this morning.  Denies nausea and vomiting.  No fever or chills.  Denies passing flatus and no BM as of yet.  Pastrana catheter with yellow urine.  Has not been out of bed yet. Pain controlled.  Denies CP and SOB.  No acute events overnight.     Objective  PHYSICAL EXAM:  Physical Exam  Vitals reviewed.   Constitutional:       General: He is awake.      Appearance: Normal appearance.   Cardiovascular:      Rate and Rhythm: Normal rate and regular rhythm.      Pulses: Normal pulses.      Heart sounds: Normal heart sounds.   Pulmonary:      Effort: Pulmonary effort is normal.      Breath sounds: Normal breath sounds and air entry.   Abdominal:      General: Abdomen is flat. There is no distension.      Palpations: Abdomen is soft.      Tenderness: There is abdominal tenderness. There is no guarding or rebound.      Comments: Soft, non-distended.  Incisions well approximated with glue, C/D/I.  Appropriately TTP.     Genitourinary:     Comments: Pastrana catheter with yellow urine.   Musculoskeletal:         General: Normal range of motion.      Cervical back: Normal range of motion.   Skin:     General: Skin is warm and dry.   Neurological:      General: No focal deficit present.      Mental Status: He is alert and oriented to person, place, and time.   Psychiatric:         Behavior: Behavior is cooperative.         Vital signs in last 24 hours:  Vitals:    12/12/24 0800   BP: 137/87   Pulse: 82   Resp: 17   Temp: 37.3 °C (99.1 °F)   SpO2: 97%         Intake/Output this shift:    Intake/Output Summary (Last 24 hours) at 12/12/2024 0915  Last data filed at 12/12/2024 0829  Gross per 24 hour   Intake 1207.08 ml   Output 2325 ml   Net -1117.92 ml        Allergies:  No Known Allergies     Medications:  Scheduled medications  acetaminophen, 975 mg, oral, q6h  amLODIPine, 10 mg, oral, Daily  enoxaparin, 40 mg, subcutaneous, q24h  polyethylene glycol, 17 g, oral,  Daily  simethicone, 160 mg, oral, TID after meals      Continuous medications     PRN medications  PRN medications: benzocaine-menthol, hydrALAZINE, melatonin, ondansetron ODT **OR** ondansetron, oxyCODONE, oxyCODONE       Labs:  Results for orders placed or performed during the hospital encounter of 12/11/24 (from the past 24 hours)   CBC   Result Value Ref Range    WBC 10.1 4.4 - 11.3 x10*3/uL    nRBC 0.0 0.0 - 0.0 /100 WBCs    RBC 4.70 4.50 - 5.90 x10*6/uL    Hemoglobin 12.6 (L) 13.5 - 17.5 g/dL    Hematocrit 39.5 (L) 41.0 - 52.0 %    MCV 84 80 - 100 fL    MCH 26.8 26.0 - 34.0 pg    MCHC 31.9 (L) 32.0 - 36.0 g/dL    RDW 13.0 11.5 - 14.5 %    Platelets 272 150 - 450 x10*3/uL   Basic metabolic panel   Result Value Ref Range    Glucose 121 (H) 74 - 99 mg/dL    Sodium 138 136 - 145 mmol/L    Potassium 4.4 3.5 - 5.3 mmol/L    Chloride 106 98 - 107 mmol/L    Bicarbonate 24 21 - 32 mmol/L    Anion Gap 12 10 - 20 mmol/L    Urea Nitrogen 17 6 - 23 mg/dL    Creatinine 1.27 0.50 - 1.30 mg/dL    eGFR 60 (L) >60 mL/min/1.73m*2    Calcium 8.6 8.6 - 10.3 mg/dL        Imaging:  ECG 12 Lead    Result Date: 12/6/2024  Normal sinus rhythm Incomplete right bundle branch block Borderline ECG No previous ECGs available Confirmed by Tom Pillai ([id number]) on 12/6/2024 5:11:02 PM           Plan  Prostate cancer (Multi)     POD#1:  S/p  Robotic radical prostatectomy  Bilateral pelvic lymph node dissection    - surgery as above  - avss  - Diet: low fiber   - Pain control  - Nausea: antiemetics PRN  - Encourage OOB and IS  - Lovenox for DVT prophylaxis.  Will transition to Eliquis 2.5mg BID x28 days   - Maintain ingram catheter on discharge   - DC IVF  - Cipro 500mg BID x3 days - start prior to ingram removal     - Daily labs      Plan of care discussed and patient seen with Dr. Bowman and patient can discharge home.  Home medications and prescriptions as discussed.  Follow up as scheduled.    REINIER Nunez-CNP    I spent 35  minutes in the professional and overall care of this patient.

## 2024-12-12 NOTE — NURSING NOTE
1530: discharged instruction, pt verbalized understanding. Med to bed delivered. Pastrana care educated, extra supplies given. All questions addressed. Pt's discharged home via private vehicle

## 2024-12-20 ENCOUNTER — APPOINTMENT (OUTPATIENT)
Dept: UROLOGY | Facility: CLINIC | Age: 74
End: 2024-12-20
Payer: MEDICARE

## 2024-12-20 VITALS — SYSTOLIC BLOOD PRESSURE: 145 MMHG | TEMPERATURE: 98.7 F | DIASTOLIC BLOOD PRESSURE: 84 MMHG | HEART RATE: 88 BPM

## 2024-12-20 DIAGNOSIS — R33.9 RETENTION OF URINE: ICD-10-CM

## 2024-12-20 NOTE — PROGRESS NOTES
Pt presented for trial of void and catheter removal. Pt tolerated trial of void well. Pt tolerated catheter removal well. Advised pt and spouse of normal/abnormal signs and symptoms of urinary retention and when to seek provider or ER care. Pt and spouse stated understanding.

## 2024-12-24 ENCOUNTER — APPOINTMENT (OUTPATIENT)
Dept: RADIOLOGY | Facility: HOSPITAL | Age: 74
End: 2024-12-24
Payer: MEDICARE

## 2024-12-24 ENCOUNTER — HOSPITAL ENCOUNTER (EMERGENCY)
Facility: HOSPITAL | Age: 74
Discharge: HOME | End: 2024-12-24
Attending: EMERGENCY MEDICINE
Payer: MEDICARE

## 2024-12-24 ENCOUNTER — TELEPHONE (OUTPATIENT)
Dept: UROLOGY | Facility: CLINIC | Age: 74
End: 2024-12-24

## 2024-12-24 VITALS
BODY MASS INDEX: 23.87 KG/M2 | OXYGEN SATURATION: 98 % | DIASTOLIC BLOOD PRESSURE: 88 MMHG | RESPIRATION RATE: 18 BRPM | SYSTOLIC BLOOD PRESSURE: 142 MMHG | HEART RATE: 90 BPM | TEMPERATURE: 98.1 F | HEIGHT: 74 IN | WEIGHT: 186 LBS

## 2024-12-24 DIAGNOSIS — R60.9 SWELLING: Primary | ICD-10-CM

## 2024-12-24 LAB
ALBUMIN SERPL BCP-MCNC: 3.7 G/DL (ref 3.4–5)
ALP SERPL-CCNC: 41 U/L (ref 33–136)
ALT SERPL W P-5'-P-CCNC: 11 U/L (ref 10–52)
ANION GAP SERPL CALC-SCNC: 10 MMOL/L (ref 10–20)
AST SERPL W P-5'-P-CCNC: 14 U/L (ref 9–39)
BASOPHILS # BLD AUTO: 0.02 X10*3/UL (ref 0–0.1)
BASOPHILS NFR BLD AUTO: 0.5 %
BILIRUB SERPL-MCNC: 0.7 MG/DL (ref 0–1.2)
BUN SERPL-MCNC: 9 MG/DL (ref 6–23)
CALCIUM SERPL-MCNC: 8.6 MG/DL (ref 8.6–10.3)
CHLORIDE SERPL-SCNC: 105 MMOL/L (ref 98–107)
CO2 SERPL-SCNC: 28 MMOL/L (ref 21–32)
CREAT SERPL-MCNC: 1.11 MG/DL (ref 0.5–1.3)
CRP SERPL-MCNC: 0.32 MG/DL
EGFRCR SERPLBLD CKD-EPI 2021: 71 ML/MIN/1.73M*2
EOSINOPHIL # BLD AUTO: 0.14 X10*3/UL (ref 0–0.4)
EOSINOPHIL NFR BLD AUTO: 3.2 %
ERYTHROCYTE [DISTWIDTH] IN BLOOD BY AUTOMATED COUNT: 13.1 % (ref 11.5–14.5)
GLUCOSE SERPL-MCNC: 91 MG/DL (ref 74–99)
HCT VFR BLD AUTO: 39 % (ref 41–52)
HGB BLD-MCNC: 12.7 G/DL (ref 13.5–17.5)
HOLD SPECIMEN: NORMAL
IMM GRANULOCYTES # BLD AUTO: 0.01 X10*3/UL (ref 0–0.5)
IMM GRANULOCYTES NFR BLD AUTO: 0.2 % (ref 0–0.9)
LAB AP BLOCK FOR ADDITIONAL STUDIES: NORMAL
LABORATORY COMMENT REPORT: NORMAL
LACTATE SERPL-SCNC: 0.9 MMOL/L (ref 0.4–2)
LYMPHOCYTES # BLD AUTO: 0.9 X10*3/UL (ref 0.8–3)
LYMPHOCYTES NFR BLD AUTO: 20.7 %
MCH RBC QN AUTO: 27.4 PG (ref 26–34)
MCHC RBC AUTO-ENTMCNC: 32.6 G/DL (ref 32–36)
MCV RBC AUTO: 84 FL (ref 80–100)
MONOCYTES # BLD AUTO: 0.36 X10*3/UL (ref 0.05–0.8)
MONOCYTES NFR BLD AUTO: 8.3 %
NEUTROPHILS # BLD AUTO: 2.92 X10*3/UL (ref 1.6–5.5)
NEUTROPHILS NFR BLD AUTO: 67.1 %
NRBC BLD-RTO: 0 /100 WBCS (ref 0–0)
PATH REPORT.FINAL DX SPEC: NORMAL
PATH REPORT.GROSS SPEC: NORMAL
PATH REPORT.RELEVANT HX SPEC: NORMAL
PATH REPORT.TOTAL CANCER: NORMAL
PATHOLOGY SYNOPTIC REPORT: NORMAL
PLATELET # BLD AUTO: 340 X10*3/UL (ref 150–450)
POTASSIUM SERPL-SCNC: 4.3 MMOL/L (ref 3.5–5.3)
PROT SERPL-MCNC: 6.3 G/DL (ref 6.4–8.2)
RBC # BLD AUTO: 4.63 X10*6/UL (ref 4.5–5.9)
SODIUM SERPL-SCNC: 139 MMOL/L (ref 136–145)
WBC # BLD AUTO: 4.4 X10*3/UL (ref 4.4–11.3)

## 2024-12-24 PROCEDURE — 76870 US EXAM SCROTUM: CPT | Mod: FOREIGN READ | Performed by: RADIOLOGY

## 2024-12-24 PROCEDURE — 83605 ASSAY OF LACTIC ACID: CPT

## 2024-12-24 PROCEDURE — 2550000001 HC RX 255 CONTRASTS: Performed by: EMERGENCY MEDICINE

## 2024-12-24 PROCEDURE — 74177 CT ABD & PELVIS W/CONTRAST: CPT

## 2024-12-24 PROCEDURE — 87075 CULTR BACTERIA EXCEPT BLOOD: CPT | Mod: 59,STJLAB

## 2024-12-24 PROCEDURE — 74177 CT ABD & PELVIS W/CONTRAST: CPT | Mod: FOREIGN READ | Performed by: RADIOLOGY

## 2024-12-24 PROCEDURE — 85025 COMPLETE CBC W/AUTO DIFF WBC: CPT

## 2024-12-24 PROCEDURE — 86140 C-REACTIVE PROTEIN: CPT

## 2024-12-24 PROCEDURE — 80053 COMPREHEN METABOLIC PANEL: CPT

## 2024-12-24 PROCEDURE — 2500000001 HC RX 250 WO HCPCS SELF ADMINISTERED DRUGS (ALT 637 FOR MEDICARE OP)

## 2024-12-24 PROCEDURE — 76870 US EXAM SCROTUM: CPT

## 2024-12-24 PROCEDURE — 99285 EMERGENCY DEPT VISIT HI MDM: CPT | Mod: 25 | Performed by: EMERGENCY MEDICINE

## 2024-12-24 PROCEDURE — 93976 VASCULAR STUDY: CPT | Mod: FOREIGN READ | Performed by: RADIOLOGY

## 2024-12-24 PROCEDURE — 99221 1ST HOSP IP/OBS SF/LOW 40: CPT | Performed by: UROLOGY

## 2024-12-24 PROCEDURE — 36415 COLL VENOUS BLD VENIPUNCTURE: CPT

## 2024-12-24 RX ORDER — CEPHALEXIN 500 MG/1
500 CAPSULE ORAL ONCE
Status: COMPLETED | OUTPATIENT
Start: 2024-12-24 | End: 2024-12-24

## 2024-12-24 RX ORDER — VANCOMYCIN HYDROCHLORIDE 1 G/20ML
INJECTION, POWDER, LYOPHILIZED, FOR SOLUTION INTRAVENOUS DAILY PRN
Status: DISCONTINUED | OUTPATIENT
Start: 2024-12-24 | End: 2024-12-24

## 2024-12-24 RX ORDER — CEPHALEXIN 500 MG/1
500 CAPSULE ORAL 4 TIMES DAILY
Qty: 40 CAPSULE | Refills: 0 | Status: SHIPPED | OUTPATIENT
Start: 2024-12-24 | End: 2025-01-03

## 2024-12-24 ASSESSMENT — LIFESTYLE VARIABLES
TOTAL SCORE: 0
EVER HAD A DRINK FIRST THING IN THE MORNING TO STEADY YOUR NERVES TO GET RID OF A HANGOVER: NO
EVER FELT BAD OR GUILTY ABOUT YOUR DRINKING: NO
HAVE PEOPLE ANNOYED YOU BY CRITICIZING YOUR DRINKING: NO
HAVE YOU EVER FELT YOU SHOULD CUT DOWN ON YOUR DRINKING: NO

## 2024-12-24 ASSESSMENT — COLUMBIA-SUICIDE SEVERITY RATING SCALE - C-SSRS
2. HAVE YOU ACTUALLY HAD ANY THOUGHTS OF KILLING YOURSELF?: NO
6. HAVE YOU EVER DONE ANYTHING, STARTED TO DO ANYTHING, OR PREPARED TO DO ANYTHING TO END YOUR LIFE?: NO
1. IN THE PAST MONTH, HAVE YOU WISHED YOU WERE DEAD OR WISHED YOU COULD GO TO SLEEP AND NOT WAKE UP?: NO

## 2024-12-24 ASSESSMENT — PAIN SCALES - GENERAL
PAINLEVEL_OUTOF10: 0 - NO PAIN

## 2024-12-24 ASSESSMENT — PAIN - FUNCTIONAL ASSESSMENT
PAIN_FUNCTIONAL_ASSESSMENT: 0-10
PAIN_FUNCTIONAL_ASSESSMENT: 0-10

## 2024-12-24 NOTE — PROGRESS NOTES
Vancomycin Dosing by Pharmacy- Cessation of Therapy    Consult to pharmacy for vancomycin dosing has been discontinued by the prescriber, pharmacy will sign off at this time.    Please call pharmacy if there are further questions or re-enter a consult if vancomycin is resumed.     Kyra Leger, PharmD

## 2024-12-24 NOTE — DISCHARGE INSTRUCTIONS
You were seen in the emergency department today for evaluation of swelling in her groin.  Urology has indicated this is likely a postoperative change.  Please follow-up with them and continue all your postoperative appointments.  If you develop any new pain fever nausea or vomiting please return back to the emergency department soon as possible.  We have sent Keflex to your local pharmacy please pick it up and take it as indicated.

## 2024-12-24 NOTE — CONSULTS
"Vancomycin Dosing by Pharmacy - Emergency Department    Jatinder Estrada is a 74 y.o. male who pharmacy has been consulted for vancomycin one-time dosing for cellulitis, skin and soft tissue by an Emergency Department (ED) provider.    Estimated Creatinine Clearance: 69.9 mL/min (by C-G formula based on SCr of 1.11 mg/dL).    HD/PD/MANNIE: No    Blood pressure 144/79, pulse 83, temperature 36.7 °C (98.1 °F), temperature source Temporal, resp. rate 18, height 1.88 m (6' 2\"), weight 84.4 kg (186 lb), SpO2 98%.    Concern for Severe Sepsis and/or Septic Shock: No    Assessment/Plan:  Patient will not be given a loading dose.   Will initiate vancomycin ED dose, 1500 mg (17 mg/kg) x 1 dose.  Pharmacy will now discontinue the one time dose consult. Maintenance dose and continuation of vancomycin to be determined by the admitting team. If vancomycin appropriate for continuation, another pharmacy to dose vancomycin consult will be placed at that time.      Thank you for allowing me to take part in the care of this patient. Please contact pharmacy with any questions.    MAHSA CLARK   12/24/2024 3:35 PM       "

## 2024-12-24 NOTE — ED PROVIDER NOTES
EMERGENCY DEPARTMENT ENCOUNTER      Pt Name: Jatinder Estrada  MRN: [id number]  Birthdate 1950  Date of evaluation: 12/24/2024  Provider: Ignacio Mcintosh MD    CHIEF COMPLAINT       Chief Complaint   Patient presents with    Groin Swelling     Patient states swelling in testicles and groin started Friday. Pt states ingram cathteter removed Friday. Pt states he is able to urinate no blood in urine as well.       HISTORY OF PRESENT ILLNESS    HPI  74-year-old male presents emergency department with chief complaint of genital swelling and groin swelling.  Patient indicates that he had a prostate removal procedure on the 11th of this month followed by catheter placement until the 20th.  The catheter was removed since then he has had extensive swelling in his penis testicles and groin area.  He denies any pain fever discharge or bleeding.  Nursing Notes were reviewed.    PAST MEDICAL HISTORY     Past Medical History:   Diagnosis Date    Acute nasopharyngitis (common cold) 11/18/2021    Nasopharyngitis    Benign prostatic hyperplasia     Cancer (Multi)     Contact with and (suspected) exposure to covid-19 11/18/2021    Suspected COVID-19 virus infection    Elevated PSA     Encounter for immunization 09/30/2021    Encounter for immunization    History of continuous positive airway pressure (CPAP) therapy at home     Hypertension     Nasal congestion 11/18/2021    Nasal congestion with rhinorrhea    Refusal of blood product     Sleep apnea     Urinary incontinence          SURGICAL HISTORY       Past Surgical History:   Procedure Laterality Date    COLONOSCOPY      OTHER SURGICAL HISTORY  08/20/2019    Phacoemulsification of cataract and insertion of intraocular lens    OTHER SURGICAL HISTORY  08/20/2019    Hemorrhoidectomy    OTHER SURGICAL HISTORY  08/20/2019    Colonoscopy    PROSTATE SURGERY  12/11/2024         CURRENT MEDICATIONS       Previous Medications    AMLODIPINE (NORVASC) 10 MG TABLET    Take 1 tablet (10 mg) by  mouth once daily.    APIXABAN (ELIQUIS) 2.5 MG TABLET    Take 1 tablet (2.5 mg) by mouth 2 times a day for 28 days.    CHOLECALCIFEROL (VITAMIN D-3) 50 MCG (2000 UT) TABLET    Take 1 tablet (2,000 Units) by mouth once daily.    DIAPER,BRIEF,ADULT,DISPOSABLE (DEPEND EASY FIT UNDERGARMENTS) MISC    3 times daily as needed    VALSARTAN (DIOVAN) 160 MG TABLET    Take 1 tablet (160 mg) by mouth once daily.       ALLERGIES     Patient has no known allergies.    FAMILY HISTORY       Family History   Problem Relation Name Age of Onset    Diabetes Mother Sharonda Estrada     Hypertension Mother Sharonda Estrada     Heart disease Father [place]     Hypertension Father [place]     Diabetes Sister x2     No Known Problems Brother x1     Diabetes Brother [id number]           SOCIAL HISTORY       Social History     Socioeconomic History    Marital status:    Tobacco Use    Smoking status: Never    Smokeless tobacco: Never   Vaping Use    Vaping status: Never Used   Substance and Sexual Activity    Alcohol use: Yes     Comment: 2x per week 1 drink    Drug use: Not Currently    Sexual activity: Defer     Social Drivers of Health     Financial Resource Strain: Low Risk  (12/12/2024)    Overall Financial Resource Strain (CARDIA)     Difficulty of Paying Living Expenses: Not hard at all   Food Insecurity: No Food Insecurity (12/11/2024)    Hunger Vital Sign     Worried About Running Out of Food in the Last Year: Never true     Ran Out of Food in the Last Year: Never true   Transportation Needs: No Transportation Needs (12/11/2024)    PRAPARE - Transportation     Lack of Transportation (Medical): No     Lack of Transportation (Non-Medical): No   Intimate Partner Violence: Not At Risk (12/11/2024)    Humiliation, Afraid, Rape, and Kick questionnaire     Fear of Current or Ex-Partner: No     Emotionally Abused: No     Physically Abused: No     Sexually Abused: No   Housing Stability: Low Risk  (12/11/2024)    Housing Stability Vital Sign      Unable to Pay for Housing in the Last Year: No     Number of Times Moved in the Last Year: 1     Homeless in the Last Year: No       SCREENINGS                        PHYSICAL EXAM    (up to 7 for level 4, 8 or more for level 5)     ED Triage Vitals [12/24/24 1211]   Temperature Heart Rate Respirations BP   36.7 °C (98.1 °F) 86 18 152/86      Pulse Ox Temp Source Heart Rate Source Patient Position   98 % Temporal Monitor Sitting      BP Location FiO2 (%)     Right arm --       Physical Exam  Exam conducted with a chaperone present.   HENT:      Head: Normocephalic.      Nose: Nose normal.      Mouth/Throat:      Mouth: Mucous membranes are moist.   Eyes:      Extraocular Movements: Extraocular movements intact.      Pupils: Pupils are equal, round, and reactive to light.   Cardiovascular:      Rate and Rhythm: Normal rate and regular rhythm.      Pulses: Normal pulses.      Heart sounds: Normal heart sounds.   Pulmonary:      Effort: Pulmonary effort is normal.      Breath sounds: Normal breath sounds.   Abdominal:      General: Abdomen is flat.      Palpations: Abdomen is soft.   Genitourinary:     Penis: Swelling present.       Comments: Patient's penis scrotum and groin appear grossly swollen.  Unable to elicit cremasteric reflex due to the gross swelling.  The patient is nontender denies any pain to his penis or scrotum.  Neurological:      Mental Status: He is alert.          DIAGNOSTIC RESULTS     LABS:  Labs Reviewed   CBC WITH AUTO DIFFERENTIAL - Abnormal       Result Value    WBC 4.4      nRBC 0.0      RBC 4.63      Hemoglobin 12.7 (*)     Hematocrit 39.0 (*)     MCV 84      MCH 27.4      MCHC 32.6      RDW 13.1      Platelets 340      Neutrophils % 67.1      Immature Granulocytes %, Automated 0.2      Lymphocytes % 20.7      Monocytes % 8.3      Eosinophils % 3.2      Basophils % 0.5      Neutrophils Absolute 2.92      Immature Granulocytes Absolute, Automated 0.01      Lymphocytes Absolute 0.90       Monocytes Absolute 0.36      Eosinophils Absolute 0.14      Basophils Absolute 0.02     COMPREHENSIVE METABOLIC PANEL - Abnormal    Glucose 91      Sodium 139      Potassium 4.3      Chloride 105      Bicarbonate 28      Anion Gap 10      Urea Nitrogen 9      Creatinine 1.11      eGFR 71      Calcium 8.6      Albumin 3.7      Alkaline Phosphatase 41      Total Protein 6.3 (*)     AST 14      Bilirubin, Total 0.7      ALT 11         All other labs were within normal range or not returned as of this dictation.    Imaging  CT abdomen pelvis w IV contrast    (Results Pending)        Procedures  Procedures     EMERGENCY DEPARTMENT COURSE/MDM:   Medical Decision Making  74-year-old male presents emergency department chief complaint of groin swelling.  Medical management treatment emergency department will consist of reaching out to the urologist performed the procedure he indicates to get a CT he will provide recommendations as they come.  We have taken a photo and placed it in his media tab.  We have also included standard labs to rule out any possible infection.  Patient's labs revealed no sign of infection the patient does not have an elevated white blood cell count and there relatively unremarkable.  Also reflect no infection he is afebrile normotensive nontachycardic.  Radiology initially called me indicating that the patient has possible concern for foreign years gangrene and adenopathy of the iliac chain.  However after informing the radiologist of these findings.  He indicates that it could most likely be postoperative change.  We have reached out to urology.  The urologist have Evaluated the patient in person they indicate that this is postoperative changes.  They recommended that we start Keflex.  We have given the patient a dose of Keflex here and he has been discharged with local pharmacy with the remainder.  We have informed the family.  The patient will be discharged home with strict return precautions we  have informed him if he develops any fever nausea or vomiting to return back soon as possible.  Reevaluation of the patient he indicates that he has no pain and feels otherwise well.    Diagnoses as of 12/24/24 1610   Swelling        Patient and or family in agreement and understanding of treatment plan.  All questions answered.      I reviewed the case with the attending ED physician. The attending ED physician agrees with the plan. Patient and/or patient´s representative was counseled regarding labs, imaging, likely diagnosis, and plan. All questions were answered.    ED Medications administered this visit:  Medications - No data to display    New Prescriptions from this visit:    New Prescriptions    No medications on file       Follow-up:  No follow-up provider specified.      Final Impression: No diagnosis found.      (Please note that portions of this note were completed with a voice recognition program.  Efforts were made to edit the dictations but occasionally words are mis-transcribed.)     Ignacio Mcintosh MD  Resident  12/24/24 1614

## 2024-12-24 NOTE — CONSULTS
Reason For Consult  Scrotal swelling    History Of Present Illness  Jatinder Estrada is a 74 y.o. male presenting with scrotal swelling.  Patient recently underwent robotic prostatectomy for prostate cancer.  Postoperative course was uncomplicated.  Catheter was removed earlier this week.  Subsequently developed swelling of the penis and scrotum.  Was worsening.  He did ice occasionally.  He has not used compression or elevation.  Denies fever or chills.  Denies tenderness or pain in the scrotum or testicles.  No fever or chills.  Voiding without difficulty.  Reports urine is clear.  Denies hematuria, dysuria, urgency, or frequency.     Past Medical History  He has a past medical history of Acute nasopharyngitis (common cold) (11/18/2021), Benign prostatic hyperplasia, Cancer (Multi), Contact with and (suspected) exposure to covid-19 (11/18/2021), Elevated PSA, Encounter for immunization (09/30/2021), History of continuous positive airway pressure (CPAP) therapy at home, Hypertension, Nasal congestion (11/18/2021), Refusal of blood product, Sleep apnea, and Urinary incontinence.    Surgical History  He has a past surgical history that includes Other surgical history (08/20/2019); Other surgical history (08/20/2019); Other surgical history (08/20/2019); Prostate surgery (12/11/2024); and Colonoscopy.     Social History  He reports that he has never smoked. He has never used smokeless tobacco. He reports current alcohol use. He reports that he does not currently use drugs.    Family History  Family History   Problem Relation Name Age of Onset    Diabetes Mother Sharonda Estrada     Hypertension Mother Sharonda Estrada     Heart disease Father [place]     Hypertension Father [place]     Diabetes Sister x2     No Known Problems Brother x1     Diabetes Brother [id number]         Allergies  Patient has no known allergies.    Review of Systems  A 12 system review was completed and is negative with the exception of those signs and  "symptoms noted in the history of present illness.     Physical Exam  General: in NAD, appears stated age  Head: normocephalic, atraumatic  Neck: supple; trachea is midline  Respiratory: normal effort, no use of accessory muscles  Cardiovascular: no peripheral edema  Abdomen: soft, nondistended, nontender, no rebound or guarding, no organomegaly, no CVA tenderness, no hernia  Lymphatic: no lymphadenopathy noted  Skin: normal turgor, no rashes  Neurologic: grossly intact, oriented to person/place/time  Psychiatric: mode and affect appropriate  Incisions: Clean, dry, and intact, no surrounding erythema or tenderness, no drainage   : normal phallus, normal meatus, moderate penoscrotal edema, no fluctuance, no induration, no crepitus, no necrotic changes, testicles normal bilaterally      Last Recorded Vitals  Blood pressure 144/79, pulse 83, temperature 36.7 °C (98.1 °F), temperature source Temporal, resp. rate 18, height 1.88 m (6' 2\"), weight 84.4 kg (186 lb), SpO2 98%.    Relevant Results      Lab Results   Component Value Date    WBC 4.4 12/24/2024    HGB 12.7 (L) 12/24/2024    HCT 39.0 (L) 12/24/2024    MCV 84 12/24/2024     12/24/2024     Lab Results   Component Value Date    GLUCOSE 91 12/24/2024    CALCIUM 8.6 12/24/2024     12/24/2024    K 4.3 12/24/2024    CO2 28 12/24/2024     12/24/2024    BUN 9 12/24/2024    CREATININE 1.11 12/24/2024      CT scan abdomen/pelvis:   IMPRESSION:  1.  Findings suggestive of Abhay's gangrene. There is subcutaneous  edema at the mons pubis. At the base of the penis and at the upper  scrotum, there is subcutaneous fluid without a discrete drainable  collection. To the left of midline at the mons pubis, there is  subcutaneous air.  No abscess or drainable collection.  2.  There is bilateral iliac chain adenopathy. On the right, the  adenopathy measures 7 cm in AP dimension. On the left, this adenopathy  measures 5.8 cm. A diagnostic consideration would " include lymphoma.  3.  Small quantity of ascites.  4.  3 mm left-sided calyceal stone. No hydronephrosis.  The findings were reported to Dr. Ignacio Mcintosh personally via telephone  at 1522 hours on 12/24/2024.   Dr. Mcintosh informs me that the patient is status post prostatectomy on  12/11/24.  Conceivably, the subcutaneous air could be postsurgical.   What is presumed bilateral iliac chain adenopathy could be  postprocedural seromas/hematomas.    Signed by Farhad Agarwal MD  Assessment/Plan     Impression:  1.  Prostate cancer status post recent robotic prostatectomy  2.  Scrotal swelling    Plan:  I reviewed his CT imaging.  There are bilateral pelvic fluid collections which are likely lymphoceles.  I do not think this is true lymphadenopathy.  There is some subcutaneous air which is likely postsurgical in nature.  Patient's clinical exam is benign.  I have no concern for Abhay's/necrotizing infection.  I think the patient is safe to go home as long as his lactate is normal.  Will discharge with Keflex.  Also recommend daily ice 3-4 times per day and compression shorts.  Follow-up with Dr. Bowman as needed.        Sharif Camilo MD

## 2024-12-24 NOTE — TELEPHONE ENCOUNTER
"Pt called in stating that he was having issues with swelling in his genitals after having catheter removed on 12/20/24.    Spoke with pt about swollen genitals. Pt stated that the main amount of swelling is in his scrotum with some swelling in his penis. Inquired if the pt had any issues with urination. Pt stated that he did not have issues with urination at this time. Pt stated \"It's like having a softball between my legs though.\" Advised pt to seek emergency medical attention for the swelling. Advised pt to not wait until urination became an issue. Pt stated understanding and that he would go to the local ER today.  "

## 2024-12-29 LAB
BACTERIA BLD CULT: NORMAL
BACTERIA BLD CULT: NORMAL

## 2024-12-30 ENCOUNTER — APPOINTMENT (OUTPATIENT)
Dept: UROLOGY | Facility: CLINIC | Age: 74
End: 2024-12-30
Payer: MEDICARE

## 2024-12-30 VITALS
TEMPERATURE: 97.9 F | WEIGHT: 187.2 LBS | DIASTOLIC BLOOD PRESSURE: 84 MMHG | SYSTOLIC BLOOD PRESSURE: 136 MMHG | HEART RATE: 87 BPM | BODY MASS INDEX: 24.04 KG/M2

## 2024-12-30 DIAGNOSIS — C61 MALIGNANT NEOPLASM OF PROSTATE (MULTI): ICD-10-CM

## 2024-12-30 PROCEDURE — 1123F ACP DISCUSS/DSCN MKR DOCD: CPT | Performed by: UROLOGY

## 2024-12-30 PROCEDURE — 1159F MED LIST DOCD IN RCRD: CPT | Performed by: UROLOGY

## 2024-12-30 PROCEDURE — 1157F ADVNC CARE PLAN IN RCRD: CPT | Performed by: UROLOGY

## 2024-12-30 PROCEDURE — 3079F DIAST BP 80-89 MM HG: CPT | Performed by: UROLOGY

## 2024-12-30 PROCEDURE — 99024 POSTOP FOLLOW-UP VISIT: CPT | Performed by: UROLOGY

## 2024-12-30 PROCEDURE — 3075F SYST BP GE 130 - 139MM HG: CPT | Performed by: UROLOGY

## 2024-12-30 ASSESSMENT — ENCOUNTER SYMPTOMS
LOSS OF SENSATION IN FEET: 0
DEPRESSION: 0
OCCASIONAL FEELINGS OF UNSTEADINESS: 0

## 2024-12-30 ASSESSMENT — PATIENT HEALTH QUESTIONNAIRE - PHQ9
1. LITTLE INTEREST OR PLEASURE IN DOING THINGS: NOT AT ALL
2. FEELING DOWN, DEPRESSED OR HOPELESS: NOT AT ALL
SUM OF ALL RESPONSES TO PHQ9 QUESTIONS 1 AND 2: 0

## 2024-12-30 NOTE — PROGRESS NOTES
PRIOR NOTES  74-year-old male, [employer], sees me re: prostate cancer  PMH: CKD 2, BPH with urinary symptoms, JAMISON on CPAP, hypertension  Family history: no Fhx breast or prostate ca  Retired, former [employer] employee     PSA history:  05/24: 7.67 (15.4 on dutasteride)  05/22: 3.30     Pt takes terazosin and dutasteride for ~1y     Endorses severe LUTS, most bothersome is sensation of incomplete emptying or double  voiding  Endorses: weak stream, occasional intermittency, some straining, occasional urge, rare urge incontinence with dribbling (1x/wk)  Denies: hesitancy, frequency  NTF: 0-1x     PCa TIMELINE  - 5/13/24 - PSA 15.4 (adjusted for 5ARI)  - 8/28/24 - MRI prostate - 54g gland, PSAD 0.14 minimum, PR5 R post-lateral w concern for EPE and SVI, no positive nodes  - OR 10/8/24 - cT1c, no hypoechoic areas, targeted + template    - path GG5 in DIDIER, GG3 R posterior m/l  - 12/11/24 - RALP BPLND - non-nerve spare on R    - GG5, multifocal EPE R and L, b/l SVI, +LVI, PNI, Margin positive on R NVB. 0/10 nodes    - pT3bN0 M+     UPDATED SUBJECTIVE HISTORY  12/30/24 -patient is just over 2 weeks out from prostatectomy, still having large volume incontinence.  Otherwise improving as expected    Past Medical History  He has a past medical history of Acute nasopharyngitis (common cold) (11/18/2021), Benign prostatic hyperplasia, Cancer (Multi), Contact with and (suspected) exposure to covid-19 (11/18/2021), Elevated PSA, Encounter for immunization (09/30/2021), History of continuous positive airway pressure (CPAP) therapy at home, Hypertension, Nasal congestion (11/18/2021), Refusal of blood product, Sleep apnea, and Urinary incontinence.    Surgical History  He has a past surgical history that includes Other surgical history (08/20/2019); Other surgical history (08/20/2019); Other surgical history (08/20/2019); Prostate surgery (12/11/2024); and Colonoscopy.     Social History  He reports that he has never smoked.  He has never used smokeless tobacco. He reports current alcohol use. He reports that he does not currently use drugs.    Family History  Family History   Problem Relation Name Age of Onset    Diabetes Mother Sharonda Estrada     Hypertension Mother Sharonda Estrada     Heart disease Father [place] Zoo     Hypertension Father [place]     Diabetes Sister x2     No Known Problems Brother x1     Diabetes Brother [id number]         Allergies  Patient has no known allergies.    ROS: 12 system review was completed and is negative with the exception of those signs and symptoms noted in the history of present illness: A 12 system review was completed and is negative with the exception of those signs and symptoms noted in the history of present illness.     Exam:  General: in NAD, appears stated age  Head: normocephalic, atraumatic  Respiratory: normal effort, no use of accessory muscles  Cardiovascular: no edema noted  Skin: normal turgor, no rashes  Neurologic: grossly intact, oriented to person/place/time  Psychiatric: mode and affect appropriate     Last Recorded Vitals  Blood pressure 136/84, pulse 87, temperature 36.6 °C (97.9 °F), temperature source Temporal, weight 84.9 kg (187 lb 3.2 oz).    Lab Results   Component Value Date    PSASCREEN 7.67 (H) 05/03/2024    CREATININE 1.11 12/24/2024    HGB 12.7 (L) 12/24/2024         ASSESSMENT/PLAN:  # Prostate cancer  -High risk, locally advanced node-negative  -We discussed the likelihood of needing adjuvant versus salvage radiation  -We will check a PSA at 3 months, if PSA is detectable we will proceed with radiation therapy plus ADT  -We discussed the pros and cons of adjuvant versus salvage therapy.  He understands there is improved chance of biochemical control with adjuvant, however long-term survival difference between early salvage and adjuvant is unclear    Benito Bowman MD

## 2025-02-18 LAB
PSA FREE MFR SERPL: NORMAL % (CALC)
PSA FREE SERPL-MCNC: <0.1 NG/ML
PSA SERPL-MCNC: <0.1 NG/ML

## 2025-03-24 ENCOUNTER — APPOINTMENT (OUTPATIENT)
Dept: UROLOGY | Facility: CLINIC | Age: 75
End: 2025-03-24
Payer: MEDICARE

## 2025-03-24 VITALS — DIASTOLIC BLOOD PRESSURE: 87 MMHG | SYSTOLIC BLOOD PRESSURE: 149 MMHG | TEMPERATURE: 96.6 F | HEART RATE: 74 BPM

## 2025-03-24 DIAGNOSIS — C61 MALIGNANT NEOPLASM OF PROSTATE (MULTI): ICD-10-CM

## 2025-03-24 PROCEDURE — 1159F MED LIST DOCD IN RCRD: CPT | Performed by: UROLOGY

## 2025-03-24 PROCEDURE — 1036F TOBACCO NON-USER: CPT | Performed by: UROLOGY

## 2025-03-24 PROCEDURE — 3079F DIAST BP 80-89 MM HG: CPT | Performed by: UROLOGY

## 2025-03-24 PROCEDURE — G2211 COMPLEX E/M VISIT ADD ON: HCPCS | Performed by: UROLOGY

## 2025-03-24 PROCEDURE — 1123F ACP DISCUSS/DSCN MKR DOCD: CPT | Performed by: UROLOGY

## 2025-03-24 PROCEDURE — 3077F SYST BP >= 140 MM HG: CPT | Performed by: UROLOGY

## 2025-03-24 PROCEDURE — 1157F ADVNC CARE PLAN IN RCRD: CPT | Performed by: UROLOGY

## 2025-03-24 PROCEDURE — 99214 OFFICE O/P EST MOD 30 MIN: CPT | Performed by: UROLOGY

## 2025-03-24 PROCEDURE — 1160F RVW MEDS BY RX/DR IN RCRD: CPT | Performed by: UROLOGY

## 2025-03-24 ASSESSMENT — ENCOUNTER SYMPTOMS
DEPRESSION: 0
LOSS OF SENSATION IN FEET: 0
OCCASIONAL FEELINGS OF UNSTEADINESS: 0

## 2025-03-24 ASSESSMENT — PATIENT HEALTH QUESTIONNAIRE - PHQ9
SUM OF ALL RESPONSES TO PHQ9 QUESTIONS 1 AND 2: 0
1. LITTLE INTEREST OR PLEASURE IN DOING THINGS: NOT AT ALL
2. FEELING DOWN, DEPRESSED OR HOPELESS: NOT AT ALL

## 2025-03-24 NOTE — PROGRESS NOTES
CHIEF COMPLAINT:  Mr. Estrada presents to the office today to followup after prostatectomy on 12/11/2024 for pT3bN0 M+ prostate cancer, GG5, multifocal EPE R and L, b/l SVI, +LVI, PNI, Margin positive on R NVB. He is 3 months, 1 week, and 6 days postop.    PROSTATE CANCER HISTORY:  Mr. Estrada was initially diagnosed with prostate cancer in May 2024 with a PSA of 15.4 (adjusted for 5ARI). MRI on 08/28/2024 showed a 54g gland, PSAD 0.14 minimum, PR5 R post-lateral with concern for EPE and SVI, no positive nodes. Biopsy on 10/08/2024 revealed GG5 in DIDIER, GG3 R posterior m/l. He underwent RALP BPLND on 12/11/2024 with non-nerve sparing on R. Pathology showed GG5, multifocal EPE R and L, b/l SVI, +LVI, PNI, Margin positive on R NVB, 0/10 nodes. At 2 weeks post-op, he was still experiencing large volume incontinence but otherwise improving as expected.    I have independently reviewed and interpreted all relevant notes, imaging, and test results.    RECOVERY MILESTONES:  - Urinary continence: Minimal leakage, occasional leakage with coughing or sneezing  - Urinary stream: Good, strong stream. Patient feels he is emptying bladder well.  - Erectile function: No erectile function, as expected post-surgery    HPI TODAY 03/24/2025:  - 3 months, 1 week, and 6 days since prostatectomy  - Urinary symptoms: AUA symptom score of 3, indicating minimal lower urinary tract symptoms  - No remaining aches or pains in abdomen  - Feeling back to normal overall  - Last PSA level: Undetectable on 02/17/2025    PMH, PSH, MEDS, ALLERGIES, SH, and FH:  Information reviewed with patient and in EMR, with changes made where appropriate.  - PMH: CKD 2, BPH with urinary symptoms, JAMISON on CPAP, hypertension  - Family history: No family history of breast or prostate cancer  - Social history: Retired, former [employer] employee  - Latter day: Jainism    ASSESSMENT AND PLAN:  74-year-old man with high-risk prostate cancer status post RALP BPLND  3 months ago, currently with undetectable PSA    1. Prostate Cancer (Prostate cancer, C61)  - Assessment: High-risk prostate cancer (pT3bN0 M+, GG5, multifocal EPE, b/l SVI, +LVI, PNI, positive margin) status post RALP BPLND. Current PSA undetectable.  - Plan: Continue PSA monitoring. Next PSA check in 6 months.  - Counseling: Discussed potential for future PSA rise and need for salvage radiation if this occurs. Explained that cure is still the goal if recurrence happens. Would obtain PET scan if PSA rises before considering radiation.    2. Stress incontinence (N39.3)  - Assessment: Minimal leakage, occasional leakage with coughing or sneezing. Significant improvement since immediate post-op period.  - Plan: Continue pelvic floor exercises. Reassured that improvement can continue up to 1 year post-surgery.    3. Erectile dysfunction (N52.9)  - Assessment: No erectile function, as expected post-surgery.  - Plan: Discussed options for erectile function restoration including oral medications (though less effective post-surgery), intracavernosal injections (Trimix), and penile prosthesis. Patient to consider options and discuss if becomes a priority.  - Counseling: Explained changes in orgasm sensation post-prostatectomy.    ORDERS:  PSA test in 6 months    FOLLOW UP:  Return to clinic in 6 months with PSA results.    SHORT SUMMARY:  74-year-old man 3 months post RALP for high-risk prostate cancer with undetectable PSA. Minimal urinary incontinence, no erectile function. Plan for PSA monitoring and discussion of erectile function restoration options if desired.

## 2025-04-27 DIAGNOSIS — I10 ESSENTIAL HYPERTENSION: ICD-10-CM

## 2025-04-28 RX ORDER — AMLODIPINE BESYLATE 10 MG/1
10 TABLET ORAL DAILY
Qty: 100 TABLET | Refills: 2 | Status: SHIPPED | OUTPATIENT
Start: 2025-04-28

## 2025-05-07 LAB
ALBUMIN SERPL-MCNC: 3.9 G/DL (ref 3.6–5.1)
ALP SERPL-CCNC: 55 U/L (ref 35–144)
ALT SERPL-CCNC: 18 U/L (ref 9–46)
ANION GAP SERPL CALCULATED.4IONS-SCNC: 6 MMOL/L (CALC) (ref 7–17)
AST SERPL-CCNC: 19 U/L (ref 10–35)
BASOPHILS # BLD AUTO: 10 CELLS/UL (ref 0–200)
BASOPHILS NFR BLD AUTO: 0.3 %
BILIRUB SERPL-MCNC: 0.6 MG/DL (ref 0.2–1.2)
BUN SERPL-MCNC: 11 MG/DL (ref 7–25)
CALCIUM SERPL-MCNC: 8.9 MG/DL (ref 8.6–10.3)
CHLORIDE SERPL-SCNC: 106 MMOL/L (ref 98–110)
CHOLEST SERPL-MCNC: 155 MG/DL
CHOLEST/HDLC SERPL: 3.4 (CALC)
CO2 SERPL-SCNC: 29 MMOL/L (ref 20–32)
CREAT SERPL-MCNC: 1.15 MG/DL (ref 0.7–1.28)
EGFRCR SERPLBLD CKD-EPI 2021: 68 ML/MIN/1.73M2
EOSINOPHIL # BLD AUTO: 150 CELLS/UL (ref 15–500)
EOSINOPHIL NFR BLD AUTO: 4.7 %
ERYTHROCYTE [DISTWIDTH] IN BLOOD BY AUTOMATED COUNT: 13.8 % (ref 11–15)
GLUCOSE SERPL-MCNC: 104 MG/DL (ref 65–99)
HCT VFR BLD AUTO: 45.8 % (ref 38.5–50)
HCV AB SERPL QL IA: NORMAL
HDLC SERPL-MCNC: 45 MG/DL
HGB BLD-MCNC: 15.1 G/DL (ref 13.2–17.1)
LDLC SERPL CALC-MCNC: 97 MG/DL (CALC)
LYMPHOCYTES # BLD AUTO: 1056 CELLS/UL (ref 850–3900)
LYMPHOCYTES NFR BLD AUTO: 33 %
MAGNESIUM SERPL-MCNC: 2.1 MG/DL (ref 1.5–2.5)
MCH RBC QN AUTO: 27.5 PG (ref 27–33)
MCHC RBC AUTO-ENTMCNC: 33 G/DL (ref 32–36)
MCV RBC AUTO: 83.4 FL (ref 80–100)
MONOCYTES # BLD AUTO: 368 CELLS/UL (ref 200–950)
MONOCYTES NFR BLD AUTO: 11.5 %
NEUTROPHILS # BLD AUTO: 1616 CELLS/UL (ref 1500–7800)
NEUTROPHILS NFR BLD AUTO: 50.5 %
NONHDLC SERPL-MCNC: 110 MG/DL (CALC)
PLATELET # BLD AUTO: 293 THOUSAND/UL (ref 140–400)
PMV BLD REES-ECKER: 10.3 FL (ref 7.5–12.5)
POTASSIUM SERPL-SCNC: 4.8 MMOL/L (ref 3.5–5.3)
PROT SERPL-MCNC: 6.4 G/DL (ref 6.1–8.1)
RBC # BLD AUTO: 5.49 MILLION/UL (ref 4.2–5.8)
SODIUM SERPL-SCNC: 141 MMOL/L (ref 135–146)
TRIGL SERPL-MCNC: 52 MG/DL
TSH SERPL-ACNC: 0.95 MIU/L (ref 0.4–4.5)
WBC # BLD AUTO: 3.2 THOUSAND/UL (ref 3.8–10.8)

## 2025-05-19 ENCOUNTER — APPOINTMENT (OUTPATIENT)
Dept: PRIMARY CARE | Facility: CLINIC | Age: 75
End: 2025-05-19
Payer: MEDICARE

## 2025-05-19 VITALS
TEMPERATURE: 97.3 F | WEIGHT: 186 LBS | BODY MASS INDEX: 24.65 KG/M2 | RESPIRATION RATE: 16 BRPM | HEART RATE: 78 BPM | SYSTOLIC BLOOD PRESSURE: 136 MMHG | OXYGEN SATURATION: 97 % | DIASTOLIC BLOOD PRESSURE: 86 MMHG | HEIGHT: 73 IN

## 2025-05-19 DIAGNOSIS — Z23 ENCOUNTER FOR IMMUNIZATION: ICD-10-CM

## 2025-05-19 DIAGNOSIS — I87.2 VENOUS INSUFFICIENCY (CHRONIC) (PERIPHERAL): ICD-10-CM

## 2025-05-19 DIAGNOSIS — Z71.89 ADVANCED DIRECTIVES, COUNSELING/DISCUSSION: ICD-10-CM

## 2025-05-19 DIAGNOSIS — Z00.00 ROUTINE GENERAL MEDICAL EXAMINATION AT HEALTH CARE FACILITY: Primary | ICD-10-CM

## 2025-05-19 DIAGNOSIS — Z11.59 ENCOUNTER FOR HEPATITIS C SCREENING TEST FOR LOW RISK PATIENT: ICD-10-CM

## 2025-05-19 DIAGNOSIS — T46.1X5A ADVERSE EFFECT OF AMLODIPINE: ICD-10-CM

## 2025-05-19 DIAGNOSIS — R97.20 ELEVATED PROSTATE SPECIFIC ANTIGEN LESS THAN 10 NG/ML: ICD-10-CM

## 2025-05-19 DIAGNOSIS — I10 ESSENTIAL HYPERTENSION: ICD-10-CM

## 2025-05-19 PROCEDURE — 3008F BODY MASS INDEX DOCD: CPT | Performed by: FAMILY MEDICINE

## 2025-05-19 PROCEDURE — 1036F TOBACCO NON-USER: CPT | Performed by: FAMILY MEDICINE

## 2025-05-19 PROCEDURE — 1160F RVW MEDS BY RX/DR IN RCRD: CPT | Performed by: FAMILY MEDICINE

## 2025-05-19 PROCEDURE — 1170F FXNL STATUS ASSESSED: CPT | Performed by: FAMILY MEDICINE

## 2025-05-19 PROCEDURE — 3075F SYST BP GE 130 - 139MM HG: CPT | Performed by: FAMILY MEDICINE

## 2025-05-19 PROCEDURE — 99214 OFFICE O/P EST MOD 30 MIN: CPT | Performed by: FAMILY MEDICINE

## 2025-05-19 PROCEDURE — G0439 PPPS, SUBSEQ VISIT: HCPCS | Performed by: FAMILY MEDICINE

## 2025-05-19 PROCEDURE — 99497 ADVNCD CARE PLAN 30 MIN: CPT | Performed by: FAMILY MEDICINE

## 2025-05-19 PROCEDURE — 3079F DIAST BP 80-89 MM HG: CPT | Performed by: FAMILY MEDICINE

## 2025-05-19 PROCEDURE — 1159F MED LIST DOCD IN RCRD: CPT | Performed by: FAMILY MEDICINE

## 2025-05-19 PROCEDURE — 1158F ADVNC CARE PLAN TLK DOCD: CPT | Performed by: FAMILY MEDICINE

## 2025-05-19 RX ORDER — VALSARTAN 160 MG/1
160 TABLET ORAL DAILY
Qty: 90 TABLET | Refills: 3 | Status: SHIPPED | OUTPATIENT
Start: 2025-05-19 | End: 2026-05-14

## 2025-05-19 RX ORDER — AMLODIPINE BESYLATE 10 MG/1
10 TABLET ORAL DAILY
Qty: 90 TABLET | Refills: 3 | Status: SHIPPED | OUTPATIENT
Start: 2025-05-19 | End: 2026-05-19

## 2025-05-19 ASSESSMENT — ENCOUNTER SYMPTOMS
DIAPHORESIS: 0
LOSS OF SENSATION IN FEET: 1
WEAKNESS: 0
BRUISES/BLEEDS EASILY: 0
PALPITATIONS: 0
NECK STIFFNESS: 0
COUGH: 0
EYE REDNESS: 0
CONFUSION: 0
FACIAL SWELLING: 0
ADENOPATHY: 0
DYSURIA: 0
BLOOD IN STOOL: 0
POLYDIPSIA: 0
VOICE CHANGE: 0
BACK PAIN: 0
ARTHRALGIAS: 0
HYPERTENSION: 1
DYSPHORIC MOOD: 0
TREMORS: 0
SPEECH DIFFICULTY: 0
EYE PAIN: 0
FLANK PAIN: 0
CHEST TIGHTNESS: 0
NAUSEA: 0
HEMATURIA: 0
PHOTOPHOBIA: 0
CHILLS: 0
DEPRESSION: 0
HEADACHES: 0
FATIGUE: 0
ABDOMINAL PAIN: 0
DIARRHEA: 0
NECK PAIN: 0
FACIAL ASYMMETRY: 0
FREQUENCY: 0
VOMITING: 0
AGITATION: 0
RHINORRHEA: 0
DIZZINESS: 0
APPETITE CHANGE: 0
ABDOMINAL DISTENTION: 0
SHORTNESS OF BREATH: 0
HALLUCINATIONS: 0
UNEXPECTED WEIGHT CHANGE: 0
CHOKING: 0
EYE DISCHARGE: 0
MYALGIAS: 0
SINUS PRESSURE: 0
LIGHT-HEADEDNESS: 0
WHEEZING: 0
OCCASIONAL FEELINGS OF UNSTEADINESS: 0
NERVOUS/ANXIOUS: 0
WOUND: 0
TROUBLE SWALLOWING: 0
EYE ITCHING: 0
SEIZURES: 0
SORE THROAT: 0
ACTIVITY CHANGE: 0
FEVER: 0
JOINT SWELLING: 1
NUMBNESS: 0
SLEEP DISTURBANCE: 0
CONSTIPATION: 0

## 2025-05-19 ASSESSMENT — ACTIVITIES OF DAILY LIVING (ADL)
DOING_HOUSEWORK: INDEPENDENT
GROCERY_SHOPPING: INDEPENDENT
MANAGING_FINANCES: INDEPENDENT
BATHING: INDEPENDENT
TAKING_MEDICATION: INDEPENDENT
DRESSING: INDEPENDENT

## 2025-05-19 NOTE — PROGRESS NOTES
Subjective   Reason for Visit: Jatinder Estrada is an 74 y.o. male here for a Medicare Wellness visit.     Past Medical, Surgical, and Family History reviewed and updated in chart.    Reviewed all medications by prescribing practitioner or clinical pharmacist (such as prescriptions, OTCs, herbal therapies and supplements) and documented in the medical record.    Patient experiencing foot swelling with no associated pain on the top aspect of the right foot as well as ankle area. Swelling is on and off for the last 6 months and patient uses compression socks. Patient does not notice any changes for exercise but notices it is slightly worse in the morning.     Hypertension  This is a chronic problem. The current episode started more than 1 year ago. The problem is unchanged. The problem is controlled. Pertinent negatives include no chest pain, headaches, neck pain, palpitations or shortness of breath. There are no associated agents to hypertension. Risk factors for coronary artery disease include male gender. Past treatments include calcium channel blockers, angiotensin blockers and alpha 1 blockers. The current treatment provides significant improvement. There are no compliance problems.  There is no history of a hypertension causing med or a thyroid problem.       Patient Care Team:  Buddy Salvador DO as PCP - General  Buddy Salvador DO as PCP - [employer] PCP     Review of Systems   Constitutional:  Negative for activity change, appetite change, chills, diaphoresis, fatigue, fever and unexpected weight change.   HENT:  Negative for congestion, ear pain, facial swelling, hearing loss, mouth sores, nosebleeds, postnasal drip, rhinorrhea, sinus pressure, sneezing, sore throat, tinnitus, trouble swallowing and voice change.    Eyes:  Negative for photophobia, pain, discharge, redness, itching and visual disturbance.   Respiratory:  Negative for cough, choking, chest tightness, shortness of breath and  "wheezing.    Cardiovascular:  Negative for chest pain, palpitations and leg swelling.   Gastrointestinal:  Negative for abdominal distention, abdominal pain, blood in stool, constipation, diarrhea, nausea and vomiting.   Endocrine: Negative for cold intolerance, heat intolerance, polydipsia and polyuria.   Genitourinary:  Negative for dysuria, flank pain, frequency, hematuria and urgency.   Musculoskeletal:  Positive for joint swelling. Negative for arthralgias, back pain, myalgias, neck pain and neck stiffness.   Skin:  Negative for rash and wound.   Allergic/Immunologic: Negative for immunocompromised state.   Neurological:  Negative for dizziness, tremors, seizures, syncope, facial asymmetry, speech difficulty, weakness, light-headedness, numbness and headaches.   Hematological:  Negative for adenopathy. Does not bruise/bleed easily.   Psychiatric/Behavioral:  Negative for agitation, behavioral problems, confusion, dysphoric mood, hallucinations, self-injury, sleep disturbance and suicidal ideas. The patient is not nervous/anxious.        Objective   Vitals:  /86 (BP Location: Right arm, Patient Position: Sitting, BP Cuff Size: Large adult)   Pulse 78   Temp 36.3 °C (97.3 °F) (Temporal)   Resp 16   Ht 1.854 m (6' 1\")   Wt 84.4 kg (186 lb)   SpO2 97%   BMI 24.54 kg/m²       Physical Exam  Constitutional:       General: He is not in acute distress.     Appearance: He is not ill-appearing or diaphoretic.   HENT:      Head: Normocephalic and atraumatic.      Right Ear: External ear normal.      Left Ear: External ear normal.      Nose: Nose normal. No rhinorrhea.   Eyes:      General: Lids are normal. No scleral icterus.        Right eye: No discharge.         Left eye: No discharge.      Conjunctiva/sclera: Conjunctivae normal.   Cardiovascular:      Rate and Rhythm: Normal rate and regular rhythm.      Pulses: Normal pulses.      Heart sounds: Murmur heard.      Systolic murmur is present with a grade " of 2/6.   Pulmonary:      Effort: Pulmonary effort is normal. No tachypnea, bradypnea or respiratory distress.      Breath sounds: Normal breath sounds. No decreased breath sounds, wheezing, rhonchi or rales.   Abdominal:      General: Bowel sounds are normal. There is no distension.      Palpations: Abdomen is soft. There is no mass.      Tenderness: There is no abdominal tenderness. There is no guarding or rebound.   Musculoskeletal:         General: No swelling, tenderness or deformity.      Cervical back: No rigidity or tenderness.      Right lower leg: No edema.      Left lower leg: No edema.      Right foot: Normal range of motion.   Feet:      Comments: Swelling near ankle joint of right foot with intact range of motion and no elicited pain with ROM. No edema above ankle noted as well.   Lymphadenopathy:      Cervical: No cervical adenopathy.      Upper Body:      Right upper body: No supraclavicular adenopathy.      Left upper body: No supraclavicular adenopathy.   Skin:     General: Skin is warm and dry.      Coloration: Skin is not jaundiced or pale.      Findings: No erythema, lesion or rash.   Neurological:      General: No focal deficit present.      Mental Status: He is alert and oriented to person, place, and time.      Sensory: No sensory deficit.      Motor: No weakness or tremor.      Coordination: Coordination normal.      Gait: Gait normal.   Psychiatric:         Mood and Affect: Mood normal. Affect is not inappropriate.         Behavior: Behavior normal.         Assessment/Plan   Problem List Items Addressed This Visit       Essential hypertension    Relevant Medications    valsartan (Diovan) 160 mg tablet    amLODIPine (Norvasc) 10 mg tablet    Other Relevant Orders    CBC and Auto Differential    Comprehensive Metabolic Panel    Lipid Panel    Magnesium    TSH with reflex to Free T4 if abnormal    Follow Up In [facility] - PCP - Medicare Annual    Venous insufficiency (chronic)  (peripheral)     Other Visit Diagnoses         Routine general medical examination at health care facility    -  Primary    Relevant Orders    Follow Up In [facility] - PCP - Medicare Annual      Encounter for immunization        Relevant Orders    Follow Up In [facility] - PCP - Medicare Annual      Advanced directives, counseling/discussion        Relevant Orders    DNR (Completed)    Follow Up In [facility] - PCP - Medicare Annual      Elevated prostate specific antigen less than 10 ng/ml        Relevant Orders    Follow Up In [facility] - PCP - Medicare Annual      Encounter for hepatitis C screening test for low risk patient        Relevant Orders    Follow Up In [facility] - PCP - Medicare Annual      Adverse effect of amlodipine        Ankle swelling    Relevant Orders    Follow Up In [facility] - PCP - Medicare Annual        As far as venous insufficiency discussed options regarding therapy including referral to vascular specialist versus maintaining support stockings and gentle daily aerobic activity.  At this point patient desires only maintaining support stockings and physical activity and leg elevation.      Advance care planning was discussed including living will, power of  for healthcare and living will.  Advance care planning packet will be provided to patient at discharge.  Patient was encouraged to bring in any advanced care planning paperwork to file on the chart at their own convenience.  (16min spent discussing above)

## 2025-09-29 ENCOUNTER — APPOINTMENT (OUTPATIENT)
Dept: UROLOGY | Facility: CLINIC | Age: 75
End: 2025-09-29
Payer: MEDICARE

## 2026-06-01 ENCOUNTER — APPOINTMENT (OUTPATIENT)
Dept: PRIMARY CARE | Facility: CLINIC | Age: 76
End: 2026-06-01
Payer: MEDICARE

## (undated) DEVICE — DRAPE, LEGGINGS, 28.5 X 43 IN, DISPOSABLE, LF, STERILE

## (undated) DEVICE — Device

## (undated) DEVICE — SUTURE, MONOCRYL, 4-0, 27 IN, PS-2, UNDYED

## (undated) DEVICE — SYRINGE, 60 CC, IRRIGATION, PISTON, CATH TIP, W/LUER ADAPTER,DISP

## (undated) DEVICE — DRESSING, NON-ADHERENT, TELFA, OUCHLESS, 3 X 8 IN, STERILE

## (undated) DEVICE — DEVICE, OMNICLOSE, 10MM

## (undated) DEVICE — SOLUTION, IRRIGATION, SODIUM CHLORIDE 0.9%, 1000 ML, POUR BOTTLE

## (undated) DEVICE — TUBING SET, TRI-LUMEN, FILTERED, F/AIRSEAL

## (undated) DEVICE — GOWN, ASTOUND, XXL

## (undated) DEVICE — NEEDLE, INSUFFLATION, 13GAX120MM, DISP

## (undated) DEVICE — POSITIONING KIT, PINK PAD XL, ADVANCED TRENDELENBURG

## (undated) DEVICE — PANTY, MESH, LARGE

## (undated) DEVICE — SYRINGE, 35 CC, LUER LOCK, MONOJECT, LF

## (undated) DEVICE — DRAPE, SHEET, ENDOSCOPY, GENERAL, FENESTRATED, ARMBOARD COVER, 98 X 123.5 IN, DISPOSABLE, LF, STERILE

## (undated) DEVICE — CARE KIT, LAPAROSCOPIC, ADVANCED

## (undated) DEVICE — SUTURE, V-LOC 90 ABS, 3-0, CV-23, VIOLET

## (undated) DEVICE — SEAL, UNIVERSAL, 5-12MM

## (undated) DEVICE — DRESSING, GAUZE, DRAIN SPONGE, 6 PLY, EXCILON, 4 X 4 IN, STERILE

## (undated) DEVICE — DRAPE, COLUMN, DAVINCI XI

## (undated) DEVICE — SPONGE, GAUZE, RADIOPAQUE, 12 PLY, 4 X 8 IN, STERILE, LF

## (undated) DEVICE — COVER, TIP HOT SHEARS ENDOWRIST

## (undated) DEVICE — ADHESIVE, SKIN, DERMABOND ADVANCED, 15CM, PEN-STYLE

## (undated) DEVICE — CLIP, LIGATING, HEM-O-LOCK, MLX, LARGE, LF, PURPLE

## (undated) DEVICE — PAD, SANITARY, MAXI, U BY KOTEX, REG, LF

## (undated) DEVICE — SUTURE, VICRYL, 0, 27 IN, UR-6, VIOLET

## (undated) DEVICE — DRAPE, SHEET, UTILITY, NON ABSORBENT, 18 X 26 IN, LF

## (undated) DEVICE — APPLICATOR, CHLORAPREP, W/ORANGE TINT, 26ML

## (undated) DEVICE — RETRIEVAL SYSTEM, MONARCH, 10MM DISP ENDOSCOPIC

## (undated) DEVICE — TROCAR, KII OPTICAL BLADELESS 5MM Z THREAD 100MM LNGTH

## (undated) DEVICE — NEEDLE, BIOPSY, MAX CORE, 18G

## (undated) DEVICE — SUTURE, VICRYL, 3-0, 27 IN, SH

## (undated) DEVICE — DRAPE, ARM XI

## (undated) DEVICE — SCISSORS, MONOPOLAR, CURVED, 8MM

## (undated) DEVICE — FORCEPS, TENACULUM, XI

## (undated) DEVICE — COVER, TABLE, 44X90

## (undated) DEVICE — FORCEPS, BIPOLAR MARYLAND, DAVINCI XI

## (undated) DEVICE — DRIVER, NEEDLE LARGE, DAVINCI XI

## (undated) DEVICE — LABELS, OR GENERAL, W/MARKER

## (undated) DEVICE — DRAIN, WOUND, FLAT, HUBLESS, FULL LENGTH PERFORATION, 10 MM X 20 CM, SILICONE

## (undated) DEVICE — FORCEPS, PROGRASP, DAVINCI XI

## (undated) DEVICE — OBTURATOR, BLADELESS , SU

## (undated) DEVICE — APPLICATOR, PREP, CHLORAPREP, W/ORANGE TINT, 10.5ML

## (undated) DEVICE — TRAY, SURESTEP, SILICONE DRAINAGE BAG, STATLOCK, 16FR

## (undated) DEVICE — GLOVE, SURGICAL, PROTEXIS PI BLUE W/NEUTHERA, 8.0, PF, LF

## (undated) DEVICE — TOWEL PACK, STERILE, 4/PACK, BLUE

## (undated) DEVICE — GLOVE, SURGICAL, PROTEXIS PI , 8.0, PF, LF

## (undated) DEVICE — POSITIONING, THE PINK PAD, PIGAZZI SYSTEM

## (undated) DEVICE — SUTURE, V-LOC 90, 2-0, GS21

## (undated) DEVICE — ACCESS PORT, 12MM, 100MM LENGTH, LOW PROFILE W/BLADELESS OPTICAL TIP

## (undated) DEVICE — NEEDLE, SPINAL, LONG, 22 G X 5 IN, BLACK HUB

## (undated) DEVICE — ASSEMBLY, STRYKER FLOW 2, SUCTION IRRIGATOR, WITH TIP

## (undated) DEVICE — EVACUATOR, WOUND, SUCTION, CLOSED, JACKSON-PRATT, 100 CC, SILICONE

## (undated) DEVICE — DRAPE, SHEET, THREE QUARTER, FAN FOLD, 57 X 77 IN

## (undated) DEVICE — GOWN, ASTOUND, L

## (undated) DEVICE — GLOVE, SURGICAL, PROTEXIS PI MICRO, 8.0, PF, LF